# Patient Record
Sex: MALE | Race: WHITE | NOT HISPANIC OR LATINO | Employment: UNEMPLOYED | URBAN - METROPOLITAN AREA
[De-identification: names, ages, dates, MRNs, and addresses within clinical notes are randomized per-mention and may not be internally consistent; named-entity substitution may affect disease eponyms.]

---

## 2018-08-01 ENCOUNTER — OFFICE VISIT (OUTPATIENT)
Dept: URGENT CARE | Facility: CLINIC | Age: 58
End: 2018-08-01

## 2018-08-01 VITALS
TEMPERATURE: 99.1 F | RESPIRATION RATE: 16 BRPM | WEIGHT: 201.2 LBS | OXYGEN SATURATION: 96 % | HEIGHT: 71 IN | SYSTOLIC BLOOD PRESSURE: 213 MMHG | HEART RATE: 101 BPM | DIASTOLIC BLOOD PRESSURE: 112 MMHG | BODY MASS INDEX: 28.17 KG/M2
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
## 2018-08-01 DIAGNOSIS — L03.113 CELLULITIS OF RIGHT ARM: ICD-10-CM

## 2018-08-01 PROCEDURE — 90471 IMMUNIZATION ADMIN: CPT | Performed by: NURSE PRACTITIONER

## 2018-08-01 PROCEDURE — G0382 LEV 3 HOSP TYPE B ED VISIT: HCPCS | Performed by: NURSE PRACTITIONER

## 2018-08-01 PROCEDURE — 90715 TDAP VACCINE 7 YRS/> IM: CPT

## 2018-08-01 RX ORDER — AMOXICILLIN AND CLAVULANATE POTASSIUM 875; 125 MG/1; MG/1
1 TABLET, FILM COATED ORAL EVERY 12 HOURS SCHEDULED
Qty: 20 TABLET | Refills: 0 | Status: SHIPPED | OUTPATIENT
Start: 2018-08-01 | End: 2018-08-06 | Stop reason: HOSPADM

## 2018-08-01 RX ORDER — IBUPROFEN 200 MG
TABLET ORAL EVERY 6 HOURS PRN
COMMUNITY
End: 2020-01-13 | Stop reason: SDUPTHER

## 2018-08-01 NOTE — LETTER
August 1, 2018     Patient: Mirella Augustine   YOB: 1960   Date of Visit: 8/1/2018       To Whom It May Concern: It is my medical opinion that Mirella Augustine may return to work on 08/03/2018, with light duty with concerns to the right arm for one week       If you have any questions or concerns, please don't hesitate to call           Sincerely,        STEFANIE Ledesma    CC: No Recipients

## 2018-08-01 NOTE — PATIENT INSTRUCTIONS
Take meds as directed  Take motrin for pain   Take antibiotic as directed  Pt aware of BP and the need to have a PCP  Go to the ER as directed if symptoms worsen or for the BP  Pt verbalized understanding  Tetanus shot given today  List of doctors given to pt today and MUST see PCP

## 2018-08-01 NOTE — PROGRESS NOTES
NAME: Isabel Gannon is a 59 y o  male  : 1960    MRN: [id number]      Assessment and Plan   Cat bite, initial encounter [W55 01XA]  1  Cat bite, initial encounter  TDAP VACCINE GREATER THAN OR EQUAL TO 8YO IM    amoxicillin-clavulanate (AUGMENTIN) 875-125 mg per tablet   2  Cellulitis of right arm  amoxicillin-clavulanate (AUGMENTIN) 875-125 mg per tablet       Carley Simmons was seen today for animal bite  Diagnoses and all orders for this visit:    Cat bite, initial encounter  -     TDAP VACCINE GREATER THAN OR EQUAL TO 8YO IM  -     amoxicillin-clavulanate (AUGMENTIN) 875-125 mg per tablet; Take 1 tablet by mouth every 12 (twelve) hours for 10 days    Cellulitis of right arm  -     amoxicillin-clavulanate (AUGMENTIN) 875-125 mg per tablet; Take 1 tablet by mouth every 12 (twelve) hours for 10 days    animal bite form filled out today  Tetanus shot given and placed in the left deltoid by the Nurse    Patient Instructions   Patient Instructions   Take meds as directed  Take motrin for pain   Take antibiotic as directed  Pt aware of BP and the need to have a PCP  Go to the ER as directed if symptoms worsen or for the BP  Pt verbalized understanding  Tetanus shot given today  List of doctors given to pt today and MUST see PCP  Proceed to ER if symptoms worsen  Chief Complaint     Chief Complaint   Patient presents with   [address] [place] [address]     yesterday, R wrist/FA area; bit by sisters cat; last tetanus unknown; pain 5/10; redness and swelling noted; denies fevers         History of Present Illness     Pt here today after being bit by a cat and causing him pain  The cat bite was to the R wrist and forearm  The swelling and pain to the right hand and arm are high  Today pts BP is elevated and relocated from [place] 3 years ago and has not seen a PCP here since living here  He is aware of his BP is critical high and needs to follow up with pcp    He denies having headaches, CP, SOB, blurred vision, neck pain, numbness and tingling at this time  He has not been dx with HTN in the past and aware that hte needs to follow up with pcp  Pt will be started on Augmentin today and understand how to take it  Symptoms of pain or worsening symptoms occur he needs to go to the ER  Pt given tetanus today  The cat is his sisters cat, unaware if rabies up dated and will find out, aware of time frame to go to ER for rabies vaccinations if cat is not updated  He will need to go to night if the cat is not up to date  Review of Systems   Review of Systems   Skin: Positive for wound  Current Medications       Current Outpatient Prescriptions:     ibuprofen (MOTRIN) 200 mg tablet, Take by mouth every 6 (six) hours as needed for mild pain, Disp: , Rfl:     amoxicillin-clavulanate (AUGMENTIN) 875-125 mg per tablet, Take 1 tablet by mouth every 12 (twelve) hours for 10 days, Disp: 20 tablet, Rfl: 0    Current Allergies     Allergies as of 08/01/2018 - Reviewed 08/01/2018   Allergen Reaction Noted    Other  08/01/2018              History reviewed  No pertinent past medical history  History reviewed  No pertinent surgical history  No family history on file  Medications have been verified  The following portions of the patient's history were reviewed and updated as appropriate: allergies, current medications, past family history, past medical history, past social history, past surgical history and problem list     Objective   BP (!) 213/112   Pulse 101   Temp 99 1 °F (37 3 °C) (Tympanic)   Resp 16   Ht 5' 11" (1 803 m)   Wt 91 3 kg (201 lb 3 2 oz)   SpO2 96%   BMI 28 06 kg/m²      Physical Exam     Physical Exam   Constitutional: He appears well-developed and well-nourished  Musculoskeletal:        Right shoulder: He exhibits decreased range of motion, tenderness, swelling, pain and decreased strength          Arms:      BETH Ravi

## 2018-08-03 ENCOUNTER — HOSPITAL ENCOUNTER (INPATIENT)
Facility: HOSPITAL | Age: 58
LOS: 3 days | Discharge: HOME | DRG: 949 | End: 2018-08-06
Attending: EMERGENCY MEDICINE | Admitting: INTERNAL MEDICINE

## 2018-08-03 ENCOUNTER — APPOINTMENT (EMERGENCY)
Dept: RADIOLOGY | Facility: HOSPITAL | Age: 58
DRG: 949 | End: 2018-08-03

## 2018-08-03 DIAGNOSIS — Z72.0 TOBACCO ABUSE: ICD-10-CM
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
## 2018-08-03 DIAGNOSIS — L03.113 CELLULITIS OF RIGHT ARM: ICD-10-CM

## 2018-08-03 DIAGNOSIS — S51.851D: Primary | ICD-10-CM

## 2018-08-03 DIAGNOSIS — I10 ESSENTIAL HYPERTENSION: ICD-10-CM
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
## 2018-08-03 DIAGNOSIS — L08.9: Primary | ICD-10-CM

## 2018-08-03 PROBLEM — Z78.9 ALCOHOL CONSUMPTION OF ONE TO FOUR DRINKS PER DAY: Status: ACTIVE | Noted: 2018-08-03

## 2018-08-03 PROBLEM — E87.6 HYPOKALEMIA: Status: ACTIVE | Noted: 2018-08-03

## 2018-08-03 PROBLEM — S51.851S: Status: ACTIVE | Noted: 2018-08-03

## 2018-08-03 PROBLEM — E87.1 HYPONATREMIA: Status: ACTIVE | Noted: 2018-08-03
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
## 2018-08-03 LAB
ALBUMIN SERPL BCP-MCNC: 3.4 G/DL (ref 3.5–5)
ALP SERPL-CCNC: 120 U/L (ref 46–116)
ALT SERPL W P-5'-P-CCNC: 21 U/L (ref 12–78)
ANION GAP SERPL CALCULATED.3IONS-SCNC: 11 MMOL/L (ref 4–13)
AST SERPL W P-5'-P-CCNC: 12 U/L (ref 5–45)
BASOPHILS # BLD AUTO: 0.05 THOUSANDS/ΜL (ref 0–0.1)
BASOPHILS NFR BLD AUTO: 0 % (ref 0–1)
BILIRUB SERPL-MCNC: 0.7 MG/DL (ref 0.2–1)
BUN SERPL-MCNC: 12 MG/DL (ref 5–25)
CALCIUM SERPL-MCNC: 9.4 MG/DL (ref 8.3–10.1)
CHLORIDE SERPL-SCNC: 99 MMOL/L (ref 100–108)
CO2 SERPL-SCNC: 25 MMOL/L (ref 21–32)
CREAT SERPL-MCNC: 0.95 MG/DL (ref 0.6–1.3)
EOSINOPHIL # BLD AUTO: 0.01 THOUSAND/ΜL (ref 0–0.61)
EOSINOPHIL NFR BLD AUTO: 0 % (ref 0–6)
ERYTHROCYTE [DISTWIDTH] IN BLOOD BY AUTOMATED COUNT: 11.9 % (ref 11.6–15.1)
GFR SERPL CREATININE-BSD FRML MDRD: 87 ML/MIN/1.73SQ M
GLUCOSE SERPL-MCNC: 138 MG/DL (ref 65–140)
HCT VFR BLD AUTO: 44.3 % (ref 36.5–49.3)
HGB BLD-MCNC: 15.7 G/DL (ref 12–17)
IMM GRANULOCYTES # BLD AUTO: 0.07 THOUSAND/UL (ref 0–0.2)
IMM GRANULOCYTES NFR BLD AUTO: 1 % (ref 0–2)
LACTATE SERPL-SCNC: 0.9 MMOL/L (ref 0.5–2)
LYMPHOCYTES # BLD AUTO: 0.58 THOUSANDS/ΜL (ref 0.6–4.47)
LYMPHOCYTES NFR BLD AUTO: 5 % (ref 14–44)
MCH RBC QN AUTO: 35.6 PG (ref 26.8–34.3)
MCHC RBC AUTO-ENTMCNC: 35.4 G/DL (ref 31.4–37.4)
MCV RBC AUTO: 101 FL (ref 82–98)
MONOCYTES # BLD AUTO: 0.83 THOUSAND/ΜL (ref 0.17–1.22)
MONOCYTES NFR BLD AUTO: 7 % (ref 4–12)
NEUTROPHILS # BLD AUTO: 11.09 THOUSANDS/ΜL (ref 1.85–7.62)
NEUTS SEG NFR BLD AUTO: 87 % (ref 43–75)
NRBC BLD AUTO-RTO: 0 /100 WBCS
PLATELET # BLD AUTO: 181 THOUSANDS/UL (ref 149–390)
PMV BLD AUTO: 8.7 FL (ref 8.9–12.7)
POTASSIUM SERPL-SCNC: 3.3 MMOL/L (ref 3.5–5.3)
PROT SERPL-MCNC: 8.1 G/DL (ref 6.4–8.2)
RBC # BLD AUTO: 4.41 MILLION/UL (ref 3.88–5.62)
SODIUM SERPL-SCNC: 135 MMOL/L (ref 136–145)
WBC # BLD AUTO: 12.63 THOUSAND/UL (ref 4.31–10.16)

## 2018-08-03 PROCEDURE — 83605 ASSAY OF LACTIC ACID: CPT | Performed by: PHYSICIAN ASSISTANT

## 2018-08-03 PROCEDURE — 99284 EMERGENCY DEPT VISIT MOD MDM: CPT

## 2018-08-03 PROCEDURE — 87040 BLOOD CULTURE FOR BACTERIA: CPT | Performed by: PHYSICIAN ASSISTANT

## 2018-08-03 PROCEDURE — 80053 COMPREHEN METABOLIC PANEL: CPT | Performed by: PHYSICIAN ASSISTANT

## 2018-08-03 PROCEDURE — 99222 1ST HOSP IP/OBS MODERATE 55: CPT | Performed by: INTERNAL MEDICINE

## 2018-08-03 PROCEDURE — 96365 THER/PROPH/DIAG IV INF INIT: CPT

## 2018-08-03 PROCEDURE — 36415 COLL VENOUS BLD VENIPUNCTURE: CPT | Performed by: PHYSICIAN ASSISTANT

## 2018-08-03 PROCEDURE — 85025 COMPLETE CBC W/AUTO DIFF WBC: CPT | Performed by: PHYSICIAN ASSISTANT

## 2018-08-03 PROCEDURE — 73090 X-RAY EXAM OF FOREARM: CPT

## 2018-08-03 RX ORDER — THIAMINE HYDROCHLORIDE 100 MG/ML
100 INJECTION, SOLUTION INTRAMUSCULAR; INTRAVENOUS DAILY
Status: DISCONTINUED | OUTPATIENT
Start: 2018-08-04 | End: 2018-08-04

## 2018-08-03 RX ORDER — LISINOPRIL 20 MG/1
20 TABLET ORAL DAILY
Status: DISCONTINUED | OUTPATIENT
Start: 2018-08-03 | End: 2018-08-06 | Stop reason: HOSPADM

## 2018-08-03 RX ORDER — LORAZEPAM 1 MG/1
1 TABLET ORAL 3 TIMES DAILY
Status: DISCONTINUED | OUTPATIENT
Start: 2018-08-03 | End: 2018-08-06 | Stop reason: HOSPADM

## 2018-08-03 RX ORDER — DEXTROSE, SODIUM CHLORIDE, AND POTASSIUM CHLORIDE 5; .9; .15 G/100ML; G/100ML; G/100ML
100 INJECTION INTRAVENOUS CONTINUOUS
Status: DISCONTINUED | OUTPATIENT
Start: 2018-08-03 | End: 2018-08-04

## 2018-08-03 RX ORDER — ONDANSETRON 2 MG/ML
4 INJECTION INTRAMUSCULAR; INTRAVENOUS ONCE
Status: DISCONTINUED | OUTPATIENT
Start: 2018-08-03 | End: 2018-08-06 | Stop reason: HOSPADM

## 2018-08-03 RX ORDER — NICOTINE 21 MG/24HR
1 PATCH, TRANSDERMAL 24 HOURS TRANSDERMAL DAILY
Status: DISCONTINUED | OUTPATIENT
Start: 2018-08-04 | End: 2018-08-06 | Stop reason: HOSPADM

## 2018-08-03 RX ORDER — OXYCODONE HYDROCHLORIDE AND ACETAMINOPHEN 5; 325 MG/1; MG/1
1 TABLET ORAL EVERY 6 HOURS PRN
Status: DISCONTINUED | OUTPATIENT
Start: 2018-08-03 | End: 2018-08-06 | Stop reason: HOSPADM

## 2018-08-03 RX ORDER — MORPHINE SULFATE 2 MG/ML
2 INJECTION, SOLUTION INTRAMUSCULAR; INTRAVENOUS EVERY 4 HOURS PRN
Status: DISCONTINUED | OUTPATIENT
Start: 2018-08-03 | End: 2018-08-06 | Stop reason: HOSPADM

## 2018-08-03 RX ORDER — ACETAMINOPHEN 325 MG/1
650 TABLET ORAL EVERY 6 HOURS PRN
Status: DISCONTINUED | OUTPATIENT
Start: 2018-08-03 | End: 2018-08-06 | Stop reason: HOSPADM

## 2018-08-03 RX ORDER — LABETALOL HYDROCHLORIDE 5 MG/ML
10 INJECTION, SOLUTION INTRAVENOUS EVERY 4 HOURS PRN
Status: DISCONTINUED | OUTPATIENT
Start: 2018-08-03 | End: 2018-08-06 | Stop reason: HOSPADM

## 2018-08-03 RX ADMIN — SODIUM CHLORIDE 1000 ML: 0.9 INJECTION, SOLUTION INTRAVENOUS at 14:49

## 2018-08-03 RX ADMIN — LORAZEPAM 1 MG: 1 TABLET ORAL at 19:20

## 2018-08-03 RX ADMIN — LABETALOL 20 MG/4 ML (5 MG/ML) INTRAVENOUS SYRINGE 10 MG: at 19:20

## 2018-08-03 RX ADMIN — SODIUM CHLORIDE 3 G: 900 INJECTION INTRAVENOUS at 20:55

## 2018-08-03 RX ADMIN — SODIUM CHLORIDE 3 G: 900 INJECTION INTRAVENOUS at 14:36

## 2018-08-03 RX ADMIN — LISINOPRIL 20 MG: 20 TABLET ORAL at 17:18

## 2018-08-03 RX ADMIN — OXYCODONE HYDROCHLORIDE AND ACETAMINOPHEN 1 TABLET: 5; 325 TABLET ORAL at 19:39

## 2018-08-03 RX ADMIN — DEXTROSE, SODIUM CHLORIDE, AND POTASSIUM CHLORIDE 100 ML/HR: 5; .9; .15 INJECTION INTRAVENOUS at 17:45

## 2018-08-03 NOTE — ED PROVIDER NOTES
History  Chief Complaint   Patient presents with    Animal Bite     Patient states he was bit by his sisters cat on Monday and seen at the Urgent care on Tuesday  Area of redness outlined and less red hbut he staets his hand is more swollen  Patient placed on Augmentin     Patient presents to the ED with cat bite to right forearm that occurred 3 days ago  He states he was playing with his sister's cat and it bit him  He did clean the wound with alcohol after getting bit  Rabies immunizations are UTD  Patient was seen at urgent care 2 days ago and started on Augmentin and given tetanus booster, but right arm swelling and pain is worsening  Patient denies fevers/chills/nausea/vomiting  He has been compliant with antibiotic  He has significant swelling in right hand, but denies numbness/tingling  History provided by:  Patient  Cat Bite   Contact animal:  Cat  Location:  Shoulder/arm  Shoulder/arm injury location:  R forearm  Time since incident:  3 days  Pain details:     Quality:  Aching    Severity:  Moderate    Progression:  Worsening  Incident location:  Another residence  Animal's rabies vaccination status:  Up to date  Animal in possession: yes    Tetanus status:  Up to date (2 days ago)  Relieved by:  Nothing  Worsened by:  Nothing  Ineffective treatments: augmentin  Associated symptoms: rash and swelling    Associated symptoms: no fever and no numbness        Prior to Admission Medications   Prescriptions Last Dose Informant Patient Reported? Taking?   amoxicillin-clavulanate (AUGMENTIN) 875-125 mg per tablet   No No   Sig: Take 1 tablet by mouth every 12 (twelve) hours for 10 days   ibuprofen (MOTRIN) 200 mg tablet   Yes No   Sig: Take by mouth every 6 (six) hours as needed for mild pain      Facility-Administered Medications: None       History reviewed  No pertinent past medical history  History reviewed  No pertinent surgical history  History reviewed   No pertinent family history  I have reviewed and agree with the history as documented  Social History   Substance Use Topics    Smoking status: Current Every Day Smoker    Smokeless tobacco: Never Used    Alcohol use Yes        Review of Systems   Constitutional: Negative for chills and fever  Skin: Positive for color change, rash and wound  Neurological: Negative for dizziness, weakness and numbness  Psychiatric/Behavioral: Negative for confusion  All other systems reviewed and are negative  Physical Exam  Physical Exam   Constitutional: He appears well-developed and well-nourished  HENT:   Head: Normocephalic and atraumatic  Eyes: Conjunctivae are normal    Neck: Normal range of motion  Cardiovascular: Normal rate, regular rhythm and normal heart sounds  Pulses:       Radial pulses are 2+ on the right side, and 2+ on the left side  Pulmonary/Chest: Effort normal and breath sounds normal    Musculoskeletal:        Right forearm: He exhibits tenderness and swelling  He exhibits no bony tenderness and no deformity  Arms:       Right hand: He exhibits tenderness, decreased capillary refill and swelling  He exhibits normal range of motion, no bony tenderness, no deformity and no laceration  Normal sensation noted  Normal strength noted  Skin: Skin is warm and dry  There is erythema (right forearm and hand  )  Capillary refill 3 seconds on right hand  Right hand erythema  Not pale or cold  Nursing note and vitals reviewed        Vital Signs  ED Triage Vitals [08/03/18 1354]   Temperature Pulse Respirations Blood Pressure SpO2   99 6 °F (37 6 °C) (!) 116 20 (!) 190/120 94 %      Temp src Heart Rate Source Patient Position - Orthostatic VS BP Location FiO2 (%)   -- -- -- -- --      Pain Score       7           Vitals:    08/03/18 1358 08/03/18 1445 08/03/18 1450 08/03/18 1500   BP: (!) 163/109 (!) 191/111 (!) 191/110 (!) 192/104   Pulse: (!) 114 96 94 88       Visual Acuity      ED Medications  Medications   ampicillin-sulbactam (UNASYN) 3 g in sodium chloride 0 9 % 100 mL IVPB (0 g Intravenous Stopped 8/3/18 1513)   sodium chloride 0 9 % bolus 1,000 mL (1,000 mL Intravenous New Bag 8/3/18 1449)       Diagnostic Studies  Results Reviewed     Procedure Component Value Units Date/Time    Lactic acid, plasma [[id number]]  (Normal) Collected:  08/03/18 1447    Lab Status:  Final result Specimen:  Blood from Arm, Right Updated:  08/03/18 1521     LACTIC ACID 0 9 mmol/L     Narrative:         Result may be elevated if tourniquet was used during collection  Blood culture #2 [[id number]] Collected:  08/03/18 1447    Lab Status: In process Specimen:  Blood from Arm, Left Updated:  08/03/18 1459    Blood culture #1 [[id number]] Collected:  08/03/18 1447    Lab Status: In process Specimen:  Blood from Arm, Right Updated:  08/03/18 1459    Comprehensive metabolic panel [[id number]]  (Abnormal) Collected:  08/03/18 1425    Lab Status:  Final result Specimen:  Blood from Arm, Left Updated:  08/03/18 1456     Sodium 135 (L) mmol/L      Potassium 3 3 (L) mmol/L      Chloride 99 (L) mmol/L      CO2 25 mmol/L      Anion Gap 11 mmol/L      BUN 12 mg/dL      Creatinine 0 95 mg/dL      Glucose 138 mg/dL      Calcium 9 4 mg/dL      AST 12 U/L      ALT 21 U/L      Alkaline Phosphatase 120 (H) U/L      Total Protein 8 1 g/dL      Albumin 3 4 (L) g/dL      Total Bilirubin 0 70 mg/dL      eGFR 87 ml/min/1 73sq m     Narrative:         National Kidney Disease Education Program recommendations are as follows:  GFR calculation is accurate only with a steady state creatinine  Chronic Kidney disease less than 60 ml/min/1 73 sq  meters  Kidney failure less than 15 ml/min/1 73 sq  meters      CBC and differential [[id number]]  (Abnormal) Collected:  08/03/18 1425    Lab Status:  Final result Specimen:  Blood from Arm, Left Updated:  08/03/18 1433     WBC 12 63 (H) Thousand/uL      RBC 4 41 Million/uL      Hemoglobin 15 7 g/dL Hematocrit 44 3 %       (H) fL      MCH 35 6 (H) pg      MCHC 35 4 g/dL      RDW 11 9 %      MPV 8 7 (L) fL      Platelets 125 Thousands/uL      nRBC 0 /100 WBCs      Neutrophils Relative 87 (H) %      Immat GRANS % 1 %      Lymphocytes Relative 5 (L) %      Monocytes Relative 7 %      Eosinophils Relative 0 %      Basophils Relative 0 %      Neutrophils Absolute 11 09 (H) Thousands/µL      Immature Grans Absolute 0 07 Thousand/uL      Lymphocytes Absolute 0 58 (L) Thousands/µL      Monocytes Absolute 0 83 Thousand/µL      Eosinophils Absolute 0 01 Thousand/µL      Basophils Absolute 0 05 Thousands/µL                  XR forearm 2 views RIGHT   Final Result by Noe Dela Cruz MD (08/03 9748)      Dorsal soft tissue swelling at the distal forearm  No teeth fragments demonstrated  No underlying bony destruction to suggest osteomyelitis  Workstation performed: [id number]                    Procedures  Procedures       Phone Contacts  ED Phone Contact    ED Course                               MDM  Number of Diagnoses or Management Options  Infected cat bite of forearm, right, subsequent encounter: established and worsening  Diagnosis management comments: Patient with infected cat bite, worsening even with oral antibiotics  Tetanus UTD  Rabies vaccine UTD          Amount and/or Complexity of Data Reviewed  Clinical lab tests: ordered and reviewed  Tests in the radiology section of CPT®: ordered and reviewed  Independent visualization of images, tracings, or specimens: yes    Patient Progress  Patient progress: stable    CritCare Time    Disposition  Final diagnoses:   Infected cat bite of forearm, right, subsequent encounter     Time reflects when diagnosis was documented in both MDM as applicable and the Disposition within this note     Time User Action Codes Description Comment    8/3/2018  3:08 PM Daisy Cortes Add [S51 851D,  L08 9,  W55 01XD] Infected cat bite of forearm, right, subsequent encounter       ED Disposition     ED Disposition Condition Comment    Admit  Case was discussed with Dr Finn Eubanks and the patient's admission status was agreed to be Admission Status: inpatient status to the service of Dr Cindi Barrientos   Follow-up Information    None         Patient's Medications   Discharge Prescriptions    No medications on file     No discharge procedures on file      ED Provider  Electronically Signed by           Xin White PA-C  08/03/18 8395

## 2018-08-03 NOTE — ED NOTES
R hand swollen and red    Area marked from yesterday and moted to have less redness and he states his hand is more swelling     Jefferson Lockett RN  08/03/18 1068

## 2018-08-03 NOTE — H&P
History and Physical - Babita Parker 59 y o  male MRN: [id number]  Unit/Bed#: ED 01 Encounter: [id number]    Assessment/Plan     Assessment:  Principal Problem:    Cat bite of forearm, right, sequela  Active Problems:    Hypertension    Alcohol consumption of one to four drinks per day      Plan:  [facility] 8 floor  IV antibiotics orthopedic consult  Patient may need I and D of hand  I am concerned about development of compartment syndrome  Pain control    Will place on blood pressure medications  Placed empirically on Ativan and thiamin  Patient claims he has stopped drinking the past without problems  History of Present Illness   HPI: Babita Parker is a 59y o  year old male who presents with cat bite of the right hand  Patient was bit by his cat on Tuesday  The next day 2 days ago he went to the Urgent Center was placed on Augmentin  In spite of this he has developed increasing pain swelling particularly over the dorsum of the right hand  He also has had some fever and chills  He patient admitted hospital for evaluation and treatment  Also noted to have an elevated blood pressure  Further to 3 years since he seen a physician however  There is no previous history of hypertension  Patient does admit to drinking 3-4 vodka tonics per night  Review of Systems   Constitutional: Negative  Respiratory: Negative  Cardiovascular: Negative  Gastrointestinal: Negative  Genitourinary: Negative  Historical Information   History reviewed  No pertinent past medical history  History reviewed  No pertinent surgical history  Social History   History   Alcohol Use    Yes     History   Drug Use No     History   Smoking Status    Current Every Day Smoker   Smokeless Tobacco    Never Used     Family History: History reviewed  No pertinent family history  Meds/Allergies      No current facility-administered medications for this encounter            (Not in a hospital admission)  Allergies   Allergen Reactions    Other      Thiomersal (contact solution) - pt reaction is eye redness         Objective   Vitals: Blood pressure (!) 192/104, pulse 88, temperature 99 6 °F (37 6 °C), resp  rate 20, height 5' 11" (1 803 m), weight 91 2 kg (201 lb), SpO2 96 %  No intake or output data in the 24 hours ending 08/03/18 1634  Invasive Devices     Peripheral Intravenous Line            Peripheral IV 08/03/18 Left Antecubital less than 1 day                Physical Exam   Constitutional: He appears well-developed and well-nourished  Vitals reviewed        Lab Results:   Admission on 08/03/2018   Component Date Value    WBC 08/03/2018 12 63*    RBC 08/03/2018 4 41     Hemoglobin 08/03/2018 15 7     Hematocrit 08/03/2018 44 3     MCV 08/03/2018 101*    MCH 08/03/2018 35 6*    MCHC 08/03/2018 35 4     RDW 08/03/2018 11 9     MPV 08/03/2018 8 7*    Platelets 47/91/6612 181     nRBC 08/03/2018 0     Neutrophils Relative 08/03/2018 87*    Immat GRANS % 08/03/2018 1     Lymphocytes Relative 08/03/2018 5*    Monocytes Relative 08/03/2018 7     Eosinophils Relative 08/03/2018 0     Basophils Relative 08/03/2018 0     Neutrophils Absolute 08/03/2018 11 09*    Immature Grans Absolute 08/03/2018 0 07     Lymphocytes Absolute 08/03/2018 0 58*    Monocytes Absolute 08/03/2018 0 83     Eosinophils Absolute 08/03/2018 0 01     Basophils Absolute 08/03/2018 0 05     Sodium 08/03/2018 135*    Potassium 08/03/2018 3 3*    Chloride 08/03/2018 99*    CO2 08/03/2018 25     Anion Gap 08/03/2018 11     BUN 08/03/2018 12     Creatinine 08/03/2018 0 95     Glucose 08/03/2018 138     Calcium 08/03/2018 9 4     AST 08/03/2018 12     ALT 08/03/2018 21     Alkaline Phosphatase 08/03/2018 120*    Total Protein 08/03/2018 8 1     Albumin 08/03/2018 3 4*    Total Bilirubin 08/03/2018 0 70     eGFR 08/03/2018 87     LACTIC ACID 08/03/2018 0 9      Imaging Studies: I have personally reviewed pertinent reports  EKG, Pathology, and Other Studies: I have personally reviewed pertinent reports  VTE  Prophylaxis: Algorithmic determination of appropriate prophylaxis determined  This note has been constructed using a voice recognition system         Michaela Stapleton MD

## 2018-08-04 LAB
ANION GAP SERPL CALCULATED.3IONS-SCNC: 9 MMOL/L (ref 4–13)
BUN SERPL-MCNC: 11 MG/DL (ref 5–25)
CALCIUM SERPL-MCNC: 8.3 MG/DL (ref 8.3–10.1)
CHLORIDE SERPL-SCNC: 103 MMOL/L (ref 100–108)
CO2 SERPL-SCNC: 25 MMOL/L (ref 21–32)
CREAT SERPL-MCNC: 0.64 MG/DL (ref 0.6–1.3)
ERYTHROCYTE [DISTWIDTH] IN BLOOD BY AUTOMATED COUNT: 11.9 % (ref 11.6–15.1)
GFR SERPL CREATININE-BSD FRML MDRD: 106 ML/MIN/1.73SQ M
GLUCOSE SERPL-MCNC: 129 MG/DL (ref 65–140)
HCT VFR BLD AUTO: 38.8 % (ref 36.5–49.3)
HGB BLD-MCNC: 12.9 G/DL (ref 12–17)
MCH RBC QN AUTO: 34.5 PG (ref 26.8–34.3)
MCHC RBC AUTO-ENTMCNC: 33.2 G/DL (ref 31.4–37.4)
MCV RBC AUTO: 104 FL (ref 82–98)
PLATELET # BLD AUTO: 170 THOUSANDS/UL (ref 149–390)
PMV BLD AUTO: 9.1 FL (ref 8.9–12.7)
POTASSIUM SERPL-SCNC: 3.2 MMOL/L (ref 3.5–5.3)
RBC # BLD AUTO: 3.74 MILLION/UL (ref 3.88–5.62)
SODIUM SERPL-SCNC: 137 MMOL/L (ref 136–145)
WBC # BLD AUTO: 7.81 THOUSAND/UL (ref 4.31–10.16)

## 2018-08-04 PROCEDURE — 99254 IP/OBS CNSLTJ NEW/EST MOD 60: CPT | Performed by: ORTHOPAEDIC SURGERY

## 2018-08-04 PROCEDURE — 85027 COMPLETE CBC AUTOMATED: CPT | Performed by: INTERNAL MEDICINE

## 2018-08-04 PROCEDURE — 80048 BASIC METABOLIC PNL TOTAL CA: CPT | Performed by: INTERNAL MEDICINE

## 2018-08-04 PROCEDURE — 99232 SBSQ HOSP IP/OBS MODERATE 35: CPT | Performed by: INTERNAL MEDICINE

## 2018-08-04 RX ORDER — THIAMINE MONONITRATE (VIT B1) 100 MG
100 TABLET ORAL DAILY
Status: DISCONTINUED | OUTPATIENT
Start: 2018-08-04 | End: 2018-08-06 | Stop reason: HOSPADM

## 2018-08-04 RX ORDER — POTASSIUM CHLORIDE 20 MEQ/1
20 TABLET, EXTENDED RELEASE ORAL 2 TIMES DAILY
Status: COMPLETED | OUTPATIENT
Start: 2018-08-04 | End: 2018-08-04

## 2018-08-04 RX ADMIN — LORAZEPAM 1 MG: 1 TABLET ORAL at 08:40

## 2018-08-04 RX ADMIN — OXYCODONE HYDROCHLORIDE AND ACETAMINOPHEN 1 TABLET: 5; 325 TABLET ORAL at 22:08

## 2018-08-04 RX ADMIN — POTASSIUM CHLORIDE 20 MEQ: 1500 TABLET, EXTENDED RELEASE ORAL at 17:01

## 2018-08-04 RX ADMIN — LORAZEPAM 1 MG: 1 TABLET ORAL at 21:58

## 2018-08-04 RX ADMIN — LISINOPRIL 20 MG: 20 TABLET ORAL at 08:39

## 2018-08-04 RX ADMIN — LORAZEPAM 1 MG: 1 TABLET ORAL at 04:05

## 2018-08-04 RX ADMIN — NICOTINE 1 PATCH: 14 PATCH TRANSDERMAL at 08:40

## 2018-08-04 RX ADMIN — THIAMINE HCL TAB 100 MG 100 MG: 100 TAB at 11:49

## 2018-08-04 RX ADMIN — SODIUM CHLORIDE 3 G: 900 INJECTION INTRAVENOUS at 02:25

## 2018-08-04 RX ADMIN — ENOXAPARIN SODIUM 40 MG: 100 INJECTION SUBCUTANEOUS at 08:40

## 2018-08-04 RX ADMIN — LORAZEPAM 1 MG: 1 TABLET ORAL at 17:01

## 2018-08-04 RX ADMIN — SODIUM CHLORIDE 3 G: 900 INJECTION INTRAVENOUS at 19:51

## 2018-08-04 RX ADMIN — LABETALOL 20 MG/4 ML (5 MG/ML) INTRAVENOUS SYRINGE 10 MG: at 23:51

## 2018-08-04 RX ADMIN — SODIUM CHLORIDE 3 G: 900 INJECTION INTRAVENOUS at 08:49

## 2018-08-04 RX ADMIN — DEXTROSE, SODIUM CHLORIDE, AND POTASSIUM CHLORIDE 100 ML/HR: 5; .9; .15 INJECTION INTRAVENOUS at 05:00

## 2018-08-04 RX ADMIN — SODIUM CHLORIDE 3 G: 900 INJECTION INTRAVENOUS at 14:58

## 2018-08-04 RX ADMIN — POTASSIUM CHLORIDE 20 MEQ: 1500 TABLET, EXTENDED RELEASE ORAL at 11:49

## 2018-08-04 NOTE — PROGRESS NOTES
Progress Note - Juan Pablo Shoemaker 59 y o  male MRN: [id number]    Unit/Bed#: [address] [place] [address] 205-02 Encounter: [id number]      Assessment:    Principal Problem:    Cat bite of forearm, right, sequela  Active Problems:    Hypertension    Alcohol consumption of one to four drinks per day    Hypokalemia    Hyponatremia  Resolved Problems:    * No resolved hospital problems  *      Plan:  Patient clearly better hopefully discharge tomorrow  Subjective:   Less pain and hand    Objective:     Vitals: Blood pressure 154/87, pulse 67, temperature 98 5 °F (36 9 °C), temperature source Oral, resp  rate 18, height 5' 11" (1 803 m), weight 91 6 kg (201 lb 15 1 oz), SpO2 96 %  ,Body mass index is 28 17 kg/m²  Intake/Output Summary (Last 24 hours) at 08/04/18 0924  Last data filed at 08/04/18 0849   Gross per 24 hour   Intake              990 ml   Output              650 ml   Net              340 ml       Physical Exam:    Alert and awake in no acute distress  Extremities:  Clearly less erythema and soft tissue swelling right upper extremity  Neuro: alert, no facial asymmetry,  not tremulous      Invasive Devices     Peripheral Intravenous Line            Peripheral IV 08/03/18 Left Antecubital less than 1 day                            Lab, Imaging and other studies: I have personally reviewed pertinent reports         Results from last 7 days  Lab Units 08/04/18  0547 08/03/18  1425   WBC Thousand/uL 7 81 12 63*   HEMOGLOBIN g/dL 12 9 15 7   HEMATOCRIT % 38 8 44 3   PLATELETS Thousands/uL 170 181   NEUTROS PCT %  --  87*   LYMPHS PCT %  --  5*   MONOS PCT %  --  7   EOS PCT %  --  0       Results from last 7 days  Lab Units 08/04/18  0547 08/03/18  1425   SODIUM mmol/L 137 135*   POTASSIUM mmol/L 3 2* 3 3*   CHLORIDE mmol/L 103 99*   CO2 mmol/L 25 25   BUN mg/dL 11 12   CREATININE mg/dL 0 64 0 95   CALCIUM mg/dL 8 3 9 4   TOTAL PROTEIN g/dL  --  8 1   BILIRUBIN TOTAL mg/dL  --  0 70   ALK PHOS U/L  --  120*   ALT U/L  --  21 AST U/L  --  12   GLUCOSE RANDOM mg/dL 129 138     No results found for: TROPONINI, CKMB, CKTOTAL      No results found for: Ayesha Jamin, SPUTUMCULTUR    Imaging:  No results found for this or any previous visit  No results found for this or any previous visit  PATIENT CENTERED ROUNDS: I have performed rounds with the nursing staff  This note has been constructed using a voice recognition system      Eliza Clark MD

## 2018-08-04 NOTE — CONSULTS
Consultation - Orthopedics   Citlaly Alvarado 59 y o  male MRN: [id number]  Unit/Bed#: [address] [place] [address] 205-02 Encounter: [id number]      Assessment/Plan     Assessment:  Left wrist cellulitis    Plan:  58-year-old male with a cat bite to the left forearm  No indication for surgical intervention at this time  Continue antibiotics  Patient was counseled on range of motion of the fingers, hand, and wrist, and elbow  He was also counseled on elevation of the upper extremity  Patient will be NPO after midnight with re-evaluation tomorrow  At this point I see no evidence of knee for surgery  History of Present Illness   Physician Requesting Consult: Rios Krishnan MD  Reason for Consult / Principal Problem:   Cat bite with hand swelling infection     HPI: Citlaly Alvarado is a 59y o  year old male who presents with pain and swelling in his left arm  He had a cat bite on July 31st   It was sister's CT  Following day he was working  He had increasing pain and swelling  He was seen at urgent care and started on an antibiotic which he took  He has not worked since Thursday or Friday but had continued increasing pain and swelling  He went to the emergency room yesterday where he was admitted for cellulitis concern over his swelling  Since being on the antibiotics patient notes improvement in his swelling and the redness since last night  He is right-hand dominant  He denies any fevers or chills  Inpatient consult to Orthopedic Surgery  Consult performed by: Maximino Baez ordered by: Saint Lashanda          Review of Systems   Constitutional: Negative  HENT: Negative  Eyes: Negative  Respiratory: Negative  Cardiovascular: Negative  Gastrointestinal: Negative  Endocrine: Negative  Genitourinary: Negative  Musculoskeletal: Positive for arthralgias, joint swelling and myalgias  Skin: Negative  Allergic/Immunologic: Negative  Neurological: Negative  Hematological: Negative  Psychiatric/Behavioral: Negative  Historical Information   History reviewed  No pertinent past medical history  History reviewed  No pertinent surgical history  Social History   History   Alcohol Use    Yes     History   Drug Use No     History   Smoking Status    Current Every Day Smoker    Packs/day: 0 50   Smokeless Tobacco    Never Used     Family History: History reviewed  No pertinent family history  Meds/Allergies   all current active meds have been reviewed  Allergies   Allergen Reactions    Other      Thiomersal (contact solution) - pt reaction is eye redness         Objective   Vitals: Blood pressure 154/87, pulse 67, temperature 98 5 °F (36 9 °C), temperature source Oral, resp  rate 18, height 5' 11" (1 803 m), weight 91 6 kg (201 lb 15 1 oz), SpO2 96 %  ,Body mass index is 28 17 kg/m²  Intake/Output Summary (Last 24 hours) at 08/04/18 0740  Last data filed at 08/04/18 0459   Gross per 24 hour   Intake              990 ml   Output              150 ml   Net              840 ml     I/O last 24 hours: In: 990 [I V :990]  Out: 150 [Urine:150]    Invasive Devices     Peripheral Intravenous Line            Peripheral IV 08/03/18 Left Antecubital less than 1 day                Physical Exam   Constitutional: He is oriented to person, place, and time  He appears well-developed and well-nourished  No distress  HENT:   Head: Normocephalic and atraumatic  Eyes: Right eye exhibits no discharge  Left eye exhibits no discharge  Neck: Normal range of motion  Neck supple  No tracheal deviation present  Cardiovascular: Normal rate and regular rhythm  Pulmonary/Chest: Effort normal  No respiratory distress  Abdominal: Soft  He exhibits no distension  There is no tenderness  Musculoskeletal: He exhibits edema  Neurological: He is alert and oriented to person, place, and time  Skin: Skin is warm  He is not diaphoretic  No erythema  Psychiatric: He has a normal mood and affect   His behavior is normal      Right Hand Exam     Comments:  Patient has minimal erythema from the middle aspect of the forearm to the dorsal aspect of the hand  There is 20° of wrist dorsiflexion and palmar flexion  There is no fluctuance  Patient has to healing puncture sites the distal forearm  There is no fluctuance or swelling in the area  Patient fingers and hand have moderate amount edema, dorsal palmar  There positive wrinkles  He is able to close to a complete composite fist       Left Hand Exam   Left hand exam is normal     Tenderness   The patient is experiencing no tenderness  Range of Motion   The patient has normal left wrist ROM  Muscle Strength   The patient has normal left wrist strength  Tests   Phalens Sign: negative  Tinels Sign (Medial Nerve): negative    Other   Erythema: absent  Sensation: normal  Pulse: present            Lab Results:   I have personally reviewed pertinent lab results  CBC:   Lab Results   Component Value Date    WBC 7 81 08/04/2018    HGB 12 9 08/04/2018    HCT 38 8 08/04/2018     (H) 08/04/2018     08/04/2018    MCH 34 5 (H) 08/04/2018    MCHC 33 2 08/04/2018    RDW 11 9 08/04/2018    MPV 9 1 08/04/2018    NRBC 0 08/03/2018     CMP:   Lab Results   Component Value Date     08/04/2018     08/04/2018    CO2 25 08/04/2018    ANIONGAP 9 08/04/2018    BUN 11 08/04/2018    CREATININE 0 64 08/04/2018    GLUCOSE 129 08/04/2018    CALCIUM 8 3 08/04/2018    AST 12 08/03/2018    ALT 21 08/03/2018    ALKPHOS 120 (H) 08/03/2018    PROT 8 1 08/03/2018    BILITOT 0 70 08/03/2018    EGFR 106 08/04/2018     Imaging Studies: I have personally reviewed pertinent films in PACS and No fractures or dislocations  No soft tissue abnormalities  EKG, Pathology, and Other Studies: I have personally reviewed pertinent reports

## 2018-08-05 LAB
ANION GAP SERPL CALCULATED.3IONS-SCNC: 7 MMOL/L (ref 4–13)
BASOPHILS # BLD AUTO: 0.04 THOUSANDS/ΜL (ref 0–0.1)
BASOPHILS NFR BLD AUTO: 1 % (ref 0–1)
BUN SERPL-MCNC: 12 MG/DL (ref 5–25)
CALCIUM SERPL-MCNC: 8.7 MG/DL (ref 8.3–10.1)
CHLORIDE SERPL-SCNC: 102 MMOL/L (ref 100–108)
CO2 SERPL-SCNC: 28 MMOL/L (ref 21–32)
CREAT SERPL-MCNC: 0.68 MG/DL (ref 0.6–1.3)
EOSINOPHIL # BLD AUTO: 0.16 THOUSAND/ΜL (ref 0–0.61)
EOSINOPHIL NFR BLD AUTO: 3 % (ref 0–6)
ERYTHROCYTE [DISTWIDTH] IN BLOOD BY AUTOMATED COUNT: 11.8 % (ref 11.6–15.1)
GFR SERPL CREATININE-BSD FRML MDRD: 104 ML/MIN/1.73SQ M
GLUCOSE SERPL-MCNC: 107 MG/DL (ref 65–140)
HCT VFR BLD AUTO: 38.4 % (ref 36.5–49.3)
HGB BLD-MCNC: 12.9 G/DL (ref 12–17)
IMM GRANULOCYTES # BLD AUTO: 0.07 THOUSAND/UL (ref 0–0.2)
IMM GRANULOCYTES NFR BLD AUTO: 1 % (ref 0–2)
LYMPHOCYTES # BLD AUTO: 1.53 THOUSANDS/ΜL (ref 0.6–4.47)
LYMPHOCYTES NFR BLD AUTO: 24 % (ref 14–44)
MCH RBC QN AUTO: 35.3 PG (ref 26.8–34.3)
MCHC RBC AUTO-ENTMCNC: 33.6 G/DL (ref 31.4–37.4)
MCV RBC AUTO: 105 FL (ref 82–98)
MONOCYTES # BLD AUTO: 0.76 THOUSAND/ΜL (ref 0.17–1.22)
MONOCYTES NFR BLD AUTO: 12 % (ref 4–12)
NEUTROPHILS # BLD AUTO: 3.96 THOUSANDS/ΜL (ref 1.85–7.62)
NEUTS SEG NFR BLD AUTO: 59 % (ref 43–75)
NRBC BLD AUTO-RTO: 0 /100 WBCS
PLATELET # BLD AUTO: 187 THOUSANDS/UL (ref 149–390)
PMV BLD AUTO: 9.2 FL (ref 8.9–12.7)
POTASSIUM SERPL-SCNC: 3.9 MMOL/L (ref 3.5–5.3)
RBC # BLD AUTO: 3.65 MILLION/UL (ref 3.88–5.62)
SODIUM SERPL-SCNC: 137 MMOL/L (ref 136–145)
WBC # BLD AUTO: 6.52 THOUSAND/UL (ref 4.31–10.16)

## 2018-08-05 PROCEDURE — 99232 SBSQ HOSP IP/OBS MODERATE 35: CPT | Performed by: INTERNAL MEDICINE

## 2018-08-05 PROCEDURE — 80048 BASIC METABOLIC PNL TOTAL CA: CPT | Performed by: INTERNAL MEDICINE

## 2018-08-05 PROCEDURE — 99231 SBSQ HOSP IP/OBS SF/LOW 25: CPT | Performed by: ORTHOPAEDIC SURGERY

## 2018-08-05 PROCEDURE — 85025 COMPLETE CBC W/AUTO DIFF WBC: CPT | Performed by: INTERNAL MEDICINE

## 2018-08-05 RX ADMIN — LISINOPRIL 20 MG: 20 TABLET ORAL at 08:56

## 2018-08-05 RX ADMIN — LORAZEPAM 1 MG: 1 TABLET ORAL at 21:32

## 2018-08-05 RX ADMIN — SODIUM CHLORIDE 3 G: 900 INJECTION INTRAVENOUS at 08:55

## 2018-08-05 RX ADMIN — SODIUM CHLORIDE 3 G: 900 INJECTION INTRAVENOUS at 14:13

## 2018-08-05 RX ADMIN — ENOXAPARIN SODIUM 40 MG: 100 INJECTION SUBCUTANEOUS at 08:56

## 2018-08-05 RX ADMIN — LORAZEPAM 1 MG: 1 TABLET ORAL at 08:56

## 2018-08-05 RX ADMIN — LABETALOL 20 MG/4 ML (5 MG/ML) INTRAVENOUS SYRINGE 10 MG: at 22:49

## 2018-08-05 RX ADMIN — SODIUM CHLORIDE 3 G: 900 INJECTION INTRAVENOUS at 01:51

## 2018-08-05 RX ADMIN — LORAZEPAM 1 MG: 1 TABLET ORAL at 16:29

## 2018-08-05 RX ADMIN — SODIUM CHLORIDE 3 G: 900 INJECTION INTRAVENOUS at 20:11

## 2018-08-05 RX ADMIN — THIAMINE HCL TAB 100 MG 100 MG: 100 TAB at 08:56

## 2018-08-05 NOTE — CASE MANAGEMENT
Thank you,  Benitez Aqq  [facility] Utilization Review Department  Phone: [phone]; Fax [phone]  ATTENTION: The Network Utilization Review Department is now centralized for our 9 Facilities  Make a note that we have a new phone and fax numbers for our Department  Please call with any questions or concerns to [phone] and carefully follow the prompts so that you are directed to the right person  All voicemails are confidential  Fax any determinations, approvals, denials, and requests for initial or continue stay review clinical to [phone]  Due to HIGH CALL volume, it would be easier if you could please send faxed requests to expedite your requests and in part, help us provide discharge notifications faster     ===============================================================================    Initial Clinical Review    Admission: Date/Time/Statement: 8/3/18 @ 1509   Orders Placed This Encounter   Procedures    Inpatient Admission (expected length of stay for this patient is greater than two midnights)     Standing Status:   Standing     Number of Occurrences:   1     Order Specific Question:   Admitting Physician     Answer:   Rolando Herbert [[id number]]     Order Specific Question:   Level of Care     Answer:   Med Surg [[id number]]     Order Specific Question:   Estimated length of stay     Answer:   More than 2 Midnights     Order Specific Question:   Certification     Answer:   I certify that inpatient services are medically necessary for this patient for a duration of greater than two midnights  See H&P and MD Progress Notes for additional information about the patient's course of treatment           ED: Date/Time/Mode of Arrival:   ED Arrival Information     Expected Arrival Acuity Means of Arrival Escorted By Service Admission Type    - 8/3/2018 13:42 Urgent Walk-In Self General Medicine Urgent    Arrival Complaint    infected cat bite rt hand          Chief Complaint:   Chief Complaint Patient presents with   [address] [place] [address]     Patient states he was bit by his sisters cat on Monday and seen at the Urgent care on Tuesday  Area of redness outlined and less red hbut he staets his hand is more swollen  Patient placed on Augmentin       History of Illness:   Mariluz Esqueda is a 59y o  year old male who presents with cat bite of the right hand  Patient was bit by his cat on Tuesday  The next day 2 days ago he went to the Urgent Center was placed on Augmentin  In spite of this he has developed increasing pain swelling particularly over the dorsum of the right hand  He also has had some fever and chills       ED Vital Signs:   ED Triage Vitals   Temperature Pulse Respirations Blood Pressure SpO2   08/03/18 1354 08/03/18 1354 08/03/18 1354 08/03/18 1354 08/03/18 1354   99 6 °F (37 6 °C) (!) 116 20 (!) 190/120 94 %      Temp Source Heart Rate Source Patient Position - Orthostatic VS BP Location FiO2 (%)   08/03/18 1910 08/03/18 2235 08/03/18 1910 08/03/18 1910 --   Oral Monitor Lying Left arm       Pain Score       08/03/18 1354       7        Wt Readings from Last 1 Encounters:   08/05/18 91 7 kg (202 lb 2 6 oz)     Abnormal Labs/Diagnostic Test Results:    WBC 08/03/2018 12 63*    RBC 08/03/2018 4 41     Hemoglobin 08/03/2018 15 7     Hematocrit 08/03/2018 44 3       Sodium 08/03/2018 135*    Potassium 08/03/2018 3 3*    Chloride 08/03/2018 99*    CO2 08/03/2018 25     Anion Gap 08/03/2018 11     BUN 08/03/2018 12     Creatinine 08/03/2018 0 95     Glucose 08/03/2018 138     Calcium 08/03/2018 9 4     AST 08/03/2018 12     ALT 08/03/2018 21     Alkaline Phosphatase 08/03/2018 120*    Total Protein 08/03/2018 8 1     Albumin 08/03/2018 3 4*    Total Bilirubin 08/03/2018 0 70     eGFR 08/03/2018 87     LACTIC ACID 08/03/2018 0 9      Right forearm X:  Dorsal soft tissue swelling at the distal forearm   No teeth fragments demonstrated   No underlying bony destruction to suggest osteomyelitis  ED Treatment:   Medication Administration from 08/03/2018 1342 to 08/03/2018 1903    Date/Time Order Dose Route Action   08/03/2018 1436 ampicillin-sulbactam (UNASYN) 3 g in sodium chloride 0 9 % 100 mL IVPB 3 g Intravenous Given   08/03/2018 1449 sodium chloride 0 9 % bolus 1,000 mL 1,000 mL Intravenous Given   08/03/2018 1745 dextrose 5 % and sodium chloride 0 9 % with KCl 20 mEq/L infusion (premix) 100 mL/hr Intravenous Given   08/03/2018 1718 lisinopril (ZESTRIL) tablet 20 mg 20 mg Oral Given          Past Medical/Surgical History: None    Admitting Diagnosis: Cellulitis [L03 90]  Infected cat bite of forearm, right, subsequent encounter [S51 851D, L08 9, W55 01XD]    Age/Sex: 59 y o  male    Assessment/Plan:   Principal Problem:    Cat bite of forearm, right, sequela  Active Problems:    Hypertension    Alcohol consumption of one to four drinks per day      Plan:  1306 Located within [facility] floor  IV antibiotics orthopedic consult  Patient may need I and D of hand  I am concerned about development of compartment syndrome  Pain control     Will place on blood pressure medications      Placed empirically on Ativan and thiamin  Patient claims he has stopped drinking the past without problems      Admission Orders:  Scheduled Meds:   Current Facility-Administered Medications:  acetaminophen 650 mg Oral Q6H PRN    ampicillin-sulbactam 3 g Intravenous Q6H Last Rate: 3 g (08/05/18 0855)   enoxaparin 40 mg Subcutaneous Daily    labetalol 10 mg Intravenous Q4H PRN    lisinopril 20 mg Oral Daily    LORazepam 1 mg Oral TID    morphine injection 2 mg Intravenous Q4H PRN    nicotine 1 patch Transdermal Daily    ondansetron 4 mg Intravenous Once    oxyCODONE-acetaminophen 1 tablet Oral Q6H PRN    thiamine 100 mg Oral Daily

## 2018-08-05 NOTE — PROGRESS NOTES
Progress Note - Orthopedics   Sofya Bell 59 y o  male MRN: [id number]  Unit/Bed#: [address] [place] [address] 205-02 Encounter: [id number]        Subjective:  Right forearm cat bite  Patient feels that he has made significant improvement  He has been working on ranging his fingers hand and wrist  Denies any fevers or chills  Denies nausea and vomiting  Vitals: Blood pressure 133/99, pulse 65, temperature 98 4 °F (36 9 °C), temperature source Oral, resp  rate 17, height 5' 11" (1 803 m), weight 91 7 kg (202 lb 2 6 oz), SpO2 96 %  ,Body mass index is 28 2 kg/m²  Intake/Output Summary (Last 24 hours) at 08/05/18 1051  Last data filed at 08/05/18 0744   Gross per 24 hour   Intake                0 ml   Output              850 ml   Net             -850 ml         Physical Exam:  right upper:   alert and ortiented, BCRx5, EPL/FPL/Abd/Add intcat, SITLT M/R/U and Patient with improvement in his overall swelling  Still has some edema of his dorsal hand area, much less tender, no fluctuance, resolved erythema, forearm is soft with minimal swelling  Lab, Imaging and other studies:   I have personally reviewed pertinent lab results  CBC:   Lab Results   Component Value Date    WBC 6 52 08/05/2018    HGB 12 9 08/05/2018    HCT 38 4 08/05/2018     (H) 08/05/2018     08/05/2018    MCH 35 3 (H) 08/05/2018    MCHC 33 6 08/05/2018    RDW 11 8 08/05/2018    MPV 9 2 08/05/2018    NRBC 0 08/05/2018         Assessment:   A 59 y o  y/o male with right forearm cellulitis following a cat bite  Plan:  Patient shows significant improvement in the last 24 hours  We will continue antibiotics  Compression dressing on the hand to help with the swelling  Continue range of motion of the hand  Elevation  No surgical indication at this time  Orthopedics will follow up patient is in the hospital   Stable for discharge from an orthopedic standpoint          Bishop Mathieu MD

## 2018-08-05 NOTE — PROGRESS NOTES
Progress Note - Elizabeth Londono 59 y o  male MRN: [id number]    Unit/Bed#: [address] [place] [address] 205-02 Encounter: [id number]      Assessment:    Principal Problem:    Cat bite of forearm, right, sequela  Active Problems:    Hypertension    Alcohol consumption of one to four drinks per day    Hypokalemia    Hyponatremia  Resolved Problems:    * No resolved hospital problems  *      Plan:  Continue IV antibiotics 1 more day  I wonder whether not some compression on the hand would improve the edema  Such as an Ace bandage    Subjective:   Less pain and hand    Objective:     Vitals: Blood pressure 133/99, pulse 65, temperature 98 4 °F (36 9 °C), temperature source Oral, resp  rate 17, height 5' 11" (1 803 m), weight 91 7 kg (202 lb 2 6 oz), SpO2 96 %  ,Body mass index is 28 2 kg/m²  Intake/Output Summary (Last 24 hours) at 08/05/18 0825  Last data filed at 08/05/18 0744   Gross per 24 hour   Intake                0 ml   Output             1350 ml   Net            -1350 ml       Physical Exam:    Alert and awake in no acute distress  Extremities:  Clearly less erythema and soft tissue swelling right upper extremity  Neuro: alert, no facial asymmetry,  not tremulous      Invasive Devices     Peripheral Intravenous Line            Peripheral IV 08/03/18 Left Antecubital 1 day                            Lab, Imaging and other studies: I have personally reviewed pertinent reports         Results from last 7 days  Lab Units 08/05/18 0437 08/04/18  0547 08/03/18  1425   WBC Thousand/uL 6 52 7 81 12 63*   HEMOGLOBIN g/dL 12 9 12 9 15 7   HEMATOCRIT % 38 4 38 8 44 3   PLATELETS Thousands/uL 187 170 181   NEUTROS PCT % 59  --  87*   LYMPHS PCT % 24  --  5*   MONOS PCT % 12  --  7   EOS PCT % 3  --  0       Results from last 7 days  Lab Units 08/05/18  0437 08/04/18  0547 08/03/18  1425   SODIUM mmol/L 137 137 135*   POTASSIUM mmol/L 3 9 3 2* 3 3*   CHLORIDE mmol/L 102 103 99*   CO2 mmol/L 28 25 25   BUN mg/dL 12 11 12   CREATININE mg/dL 0 68 0 64 0 95   CALCIUM mg/dL 8 7 8 3 9 4   TOTAL PROTEIN g/dL  --   --  8 1   BILIRUBIN TOTAL mg/dL  --   --  0 70   ALK PHOS U/L  --   --  120*   ALT U/L  --   --  21   AST U/L  --   --  12   GLUCOSE RANDOM mg/dL 107 129 138     No results found for: TROPONINI, CKMB, CKTOTAL      Lab Results   Component Value Date    BLOODCX No Growth at 24 hrs  08/03/2018    BLOODCX No Growth at 24 hrs  08/03/2018       Imaging:  No results found for this or any previous visit  No results found for this or any previous visit  PATIENT CENTERED ROUNDS: I have performed rounds with the nursing staff  This note has been constructed using a voice recognition system      Dori Weems MD

## 2018-08-06 VITALS
TEMPERATURE: 98.7 F | OXYGEN SATURATION: 98 % | DIASTOLIC BLOOD PRESSURE: 100 MMHG | RESPIRATION RATE: 18 BRPM | BODY MASS INDEX: 28.3 KG/M2 | HEART RATE: 68 BPM | SYSTOLIC BLOOD PRESSURE: 168 MMHG | WEIGHT: 202.16 LBS | HEIGHT: 71 IN

## 2018-08-06 PROBLEM — E87.6 HYPOKALEMIA: Status: RESOLVED | Noted: 2018-08-03 | Resolved: 2018-08-06

## 2018-08-06 PROBLEM — S51.851S: Status: RESOLVED | Noted: 2018-08-03 | Resolved: 2018-08-06
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
## 2018-08-06 PROBLEM — Z78.9 ALCOHOL CONSUMPTION OF ONE TO FOUR DRINKS PER DAY: Status: RESOLVED | Noted: 2018-08-03 | Resolved: 2018-08-06

## 2018-08-06 PROCEDURE — 99231 SBSQ HOSP IP/OBS SF/LOW 25: CPT | Performed by: PHYSICIAN ASSISTANT

## 2018-08-06 PROCEDURE — 99239 HOSP IP/OBS DSCHRG MGMT >30: CPT | Performed by: INTERNAL MEDICINE

## 2018-08-06 RX ORDER — AMOXICILLIN AND CLAVULANATE POTASSIUM 875; 125 MG/1; MG/1
1 TABLET, FILM COATED ORAL EVERY 12 HOURS SCHEDULED
Qty: 20 TABLET | Refills: 0 | Status: CANCELLED | OUTPATIENT
Start: 2018-08-06 | End: 2018-08-16

## 2018-08-06 RX ORDER — NICOTINE 21 MG/24HR
1 PATCH, TRANSDERMAL 24 HOURS TRANSDERMAL DAILY
Qty: 28 PATCH | Refills: 0 | Status: SHIPPED | OUTPATIENT
Start: 2018-08-07 | End: 2019-05-03 | Stop reason: ALTCHOICE

## 2018-08-06 RX ORDER — LISINOPRIL 20 MG/1
20 TABLET ORAL DAILY
Qty: 30 TABLET | Refills: 0 | Status: SHIPPED | OUTPATIENT
Start: 2018-08-07 | End: 2019-03-04 | Stop reason: SDUPTHER

## 2018-08-06 RX ORDER — AMOXICILLIN AND CLAVULANATE POTASSIUM 875; 125 MG/1; MG/1
1 TABLET, FILM COATED ORAL 2 TIMES DAILY
Qty: 20 TABLET | Refills: 0 | Status: SHIPPED | OUTPATIENT
Start: 2018-08-06 | End: 2018-08-16

## 2018-08-06 RX ADMIN — LORAZEPAM 1 MG: 1 TABLET ORAL at 08:27

## 2018-08-06 RX ADMIN — LISINOPRIL 20 MG: 20 TABLET ORAL at 08:27

## 2018-08-06 RX ADMIN — SODIUM CHLORIDE 3 G: 900 INJECTION INTRAVENOUS at 01:34

## 2018-08-06 RX ADMIN — SODIUM CHLORIDE 3 G: 900 INJECTION INTRAVENOUS at 08:32

## 2018-08-06 RX ADMIN — THIAMINE HCL TAB 100 MG 100 MG: 100 TAB at 08:27

## 2018-08-06 RX ADMIN — ENOXAPARIN SODIUM 40 MG: 100 INJECTION SUBCUTANEOUS at 08:27

## 2018-08-06 NOTE — SOCIAL WORK
Met with Pt  Pt presents AA&Ox3  Discussed role of   Pt lives with sister, mother and great niece in [address] [place] [address],  [place]  Pt is independent with adls and ambulation  No DME  Pt drives  Pt's sister does grocery shopping  Pt goes to [address] on [address]	[place]  Pt has no health insurance  PATHS aware of referral to meet with Pt to fill out medical assistance paperwork  Plan to return home upon discharge

## 2018-08-06 NOTE — DISCHARGE SUMMARY
Discharge Summary - Isabel Gannon 59 y o  male MRN: [id number]  Unit/Bed#: [address] [place] [address] Encounter: [id number]    Admission Date:    8/3/2018   Discharge Date:   08/06/18   Admitting Diagnosis:   Cellulitis [L03 90]  Infected cat bite of forearm, right, subsequent encounter [S51 851D, L08 9, W55 01XD]  Admitting Provider:   Cori Golden MD  Discharge Provider:   Kaylene Patel MD     Primary Care Physician at Discharge:   No primary care provider on file  ,None    HPI:   61-year-old male presented to emergency department with right hand swelling, erythema after a cat bite  For a detailed HPI please refer to the admission note  Procedures Performed:   No orders of the defined types were placed in this encounter  Hospital Course:   Patient was admitted with cellulitis of right forearm following a cat bite  Patient was started on IV Unasyn  Patient was seen by Orthopedics who monitored the patient and recommended continue IV antibiotics  As per Orthopedics there is no surgical indication for I and D this time  They cleared the patient from their standpoint for discharge  Patient is feeling much better and swelling and erythema has improved  Motor and sensation is intact  Patient blood culture remained negative to date and he remained afebrile  Patient is ambulating freely in the hallway without any discomfort  Patient is hemodynamically stable for discharge  Follow-up with PCP in 1 week  Follow-up with orthopedics as outpatient  Return to ER with any fever, chills, worsening swelling of right forearm or any other alarming symptoms      Consulting Providers   None     Complications:  None    Labs:   Lab Results   Component Value Date    WBC 6 52 08/05/2018    RBC 3 65 (L) 08/05/2018    HGB 12 9 08/05/2018    HCT 38 4 08/05/2018     (H) 08/05/2018    MCH 35 3 (H) 08/05/2018    RDW 11 8 08/05/2018     08/05/2018     Lab Results   Component Value Date    CREATININE 0 68 08/05/2018 BUN 12 08/05/2018     08/05/2018    K 3 9 08/05/2018     08/05/2018    CO2 28 08/05/2018    GLUCOSE 107 08/05/2018    PROT 8 1 08/03/2018    ALKPHOS 120 (H) 08/03/2018    ALT 21 08/03/2018    AST 12 08/03/2018       Treatments:  IV hydration and Unasyn, Zestril, Ativan, thiamine, nicotine patch  Discharge Diagnosis:   Principal Problem:    Cat bite of forearm, right, sequela  Active Problems:    Hypertension    Alcohol consumption of one to four drinks per day    Hypokalemia    Hyponatremia  Resolved Problems:    * No resolved hospital problems  *      Condition at Discharge:   Good     Code Status: Level 1 - Full Code  Advance Directive and Living Will: <no information>  Power of :    POLST:      Discharge instructions/Information to patient and family:   See after visit summary for information provided to patient and family  Provisions for Follow-Up Care:  See after visit summary for information related to follow-up care and any pertinent home health orders  Disposition:   Home    Planned Readmission:   No    Discharge Statement   I spent 35 minutes discharging the patient  This time was spent on the day of discharge  I had direct contact with the patient on the day of discharge  Greater than 50% of the total time was spent examining patient, answering all patient questions, arranging and discussing plan of care with patient as well as directly providing post-discharge instructions  Additional time then spent on discharge activities  Discharge Medications:  See after visit summary for reconciled discharge medications provided to patient and family        "This note has been constructed using a voice recognition system"    Kaylene Patel MD  8/6/2018,11:34 AM

## 2018-08-06 NOTE — PROGRESS NOTES
Progress Note - Orthopedics   Kalyn Meals 59 y o  male MRN: [id number]  Unit/Bed#: [address] [place] [address] 205-02 Encounter: [id number]        Subjective:  Right forearm cat bite  He continues to feel significant improvement compared to yesterday  He has been working on ranging his fingers hand and wrist  Denies any fevers or chills  Denies nausea and vomiting  Vitals: Blood pressure 168/100, pulse 68, temperature 98 7 °F (37 1 °C), temperature source Oral, resp  rate 18, height 5' 11" (1 803 m), weight 91 7 kg (202 lb 2 6 oz), SpO2 98 %  ,Body mass index is 28 2 kg/m²  Intake/Output Summary (Last 24 hours) at 08/06/18 0954  Last data filed at 08/06/18 0835   Gross per 24 hour   Intake                0 ml   Output             2750 ml   Net            -2750 ml         Physical Exam:  right upper:   A&O x 3 NAD  Right forearm:  FROM wrist and fingers  No erythema  Swelling much improved in forearm  Slight swelling of fingers  Sensation intact to light touch  Motor intact throughout  Lab, Imaging and other studies:   I have personally reviewed pertinent lab results  CBC:   No results found for: WBC, HGB, HCT, MCV, PLT, ADJUSTEDWBC, MCH, MCHC, RDW, MPV, NRBC      Assessment:   A 59 y o  y/o male with right forearm cellulitis following a cat bite  Plan:  Patient shows significant improvement in the last 24 hours  Continue antibiotics as per primary team   Compression dressing no longer needed if swelling does not return  Continue range of motion of the hand  Elevation  No surgical indication at this time  Orthopedics signing off  Stable for discharge from an orthopedic standpoint  Follow up as outpatient as needed          Alphonso Desouza PA-C

## 2018-08-06 NOTE — PLAN OF CARE
DISCHARGE PLANNING     Discharge to home or other facility with appropriate resources Adequate for Discharge        INFECTION - ADULT     Absence or prevention of progression during hospitalization Adequate for Discharge        MUSCULOSKELETAL - ADULT     Maintain proper alignment of affected body part Adequate for Discharge        PAIN - ADULT     Verbalizes/displays adequate comfort level or baseline comfort level Adequate for Discharge        Potential for Falls     Patient will remain free of falls Adequate for Discharge        SKIN/TISSUE INTEGRITY - ADULT     Skin integrity remains intact Adequate for Discharge     Incision(s), wounds(s) or drain site(s) healing without S/S of infection Adequate for Discharge

## 2018-08-06 NOTE — PLAN OF CARE
DISCHARGE PLANNING     Discharge to home or other facility with appropriate resources Progressing        INFECTION - ADULT     Absence or prevention of progression during hospitalization Progressing        MUSCULOSKELETAL - ADULT     Maintain proper alignment of affected body part Progressing        PAIN - ADULT     Verbalizes/displays adequate comfort level or baseline comfort level Progressing        Potential for Falls     Patient will remain free of falls Progressing        SKIN/TISSUE INTEGRITY - ADULT     Skin integrity remains intact Progressing     Incision(s), wounds(s) or drain site(s) healing without S/S of infection Progressing

## 2018-08-08 LAB
BACTERIA BLD CULT: NORMAL
BACTERIA BLD CULT: NORMAL

## 2019-03-04 ENCOUNTER — APPOINTMENT (EMERGENCY)
Dept: NON INVASIVE DIAGNOSTICS | Facility: HOSPITAL | Age: 59
End: 2019-03-04
Payer: COMMERCIAL

## 2019-03-04 ENCOUNTER — HOSPITAL ENCOUNTER (EMERGENCY)
Facility: HOSPITAL | Age: 59
Discharge: HOME | End: 2019-03-04
Attending: EMERGENCY MEDICINE
Payer: COMMERCIAL

## 2019-03-04 VITALS
RESPIRATION RATE: 20 BRPM | SYSTOLIC BLOOD PRESSURE: 174 MMHG | WEIGHT: 200 LBS | TEMPERATURE: 98.9 F | DIASTOLIC BLOOD PRESSURE: 102 MMHG | OXYGEN SATURATION: 96 % | HEART RATE: 98 BPM | HEIGHT: 71 IN | BODY MASS INDEX: 28 KG/M2

## 2019-03-04 DIAGNOSIS — I10 UNCONTROLLED HYPERTENSION: ICD-10-CM

## 2019-03-04 DIAGNOSIS — M79.89 SWELLING OF LIMB: ICD-10-CM

## 2019-03-04 DIAGNOSIS — I10 ESSENTIAL HYPERTENSION: ICD-10-CM

## 2019-03-04 DIAGNOSIS — I87.2 DERMATITIS, STASIS: Primary | ICD-10-CM

## 2019-03-04 LAB
ALBUMIN SERPL BCP-MCNC: 3.4 G/DL (ref 3.5–5)
ALP SERPL-CCNC: 176 U/L (ref 46–116)
ALT SERPL W P-5'-P-CCNC: 16 U/L (ref 12–78)
ANION GAP SERPL CALCULATED.3IONS-SCNC: 8 MMOL/L (ref 4–13)
APTT PPP: 29 SECONDS (ref 26–38)
AST SERPL W P-5'-P-CCNC: 12 U/L (ref 5–45)
BASOPHILS # BLD AUTO: 0.06 THOUSANDS/ΜL (ref 0–0.1)
BASOPHILS NFR BLD AUTO: 1 % (ref 0–1)
BILIRUB SERPL-MCNC: 0.6 MG/DL (ref 0.2–1)
BUN SERPL-MCNC: 20 MG/DL (ref 5–25)
CALCIUM SERPL-MCNC: 9.3 MG/DL (ref 8.3–10.1)
CHLORIDE SERPL-SCNC: 100 MMOL/L (ref 100–108)
CO2 SERPL-SCNC: 33 MMOL/L (ref 21–32)
CREAT SERPL-MCNC: 0.79 MG/DL (ref 0.6–1.3)
EOSINOPHIL # BLD AUTO: 0.08 THOUSAND/ΜL (ref 0–0.61)
EOSINOPHIL NFR BLD AUTO: 1 % (ref 0–6)
ERYTHROCYTE [DISTWIDTH] IN BLOOD BY AUTOMATED COUNT: 12.6 % (ref 11.6–15.1)
GFR SERPL CREATININE-BSD FRML MDRD: 98 ML/MIN/1.73SQ M
GLUCOSE SERPL-MCNC: 101 MG/DL (ref 65–140)
HCT VFR BLD AUTO: 43.5 % (ref 36.5–49.3)
HGB BLD-MCNC: 14.7 G/DL (ref 12–17)
IMM GRANULOCYTES # BLD AUTO: 0.05 THOUSAND/UL (ref 0–0.2)
IMM GRANULOCYTES NFR BLD AUTO: 1 % (ref 0–2)
INR PPP: 0.99 (ref 0.86–1.17)
LYMPHOCYTES # BLD AUTO: 1.02 THOUSANDS/ΜL (ref 0.6–4.47)
LYMPHOCYTES NFR BLD AUTO: 11 % (ref 14–44)
MCH RBC QN AUTO: 34.2 PG (ref 26.8–34.3)
MCHC RBC AUTO-ENTMCNC: 33.8 G/DL (ref 31.4–37.4)
MCV RBC AUTO: 101 FL (ref 82–98)
MONOCYTES # BLD AUTO: 0.67 THOUSAND/ΜL (ref 0.17–1.22)
MONOCYTES NFR BLD AUTO: 7 % (ref 4–12)
NEUTROPHILS # BLD AUTO: 7.26 THOUSANDS/ΜL (ref 1.85–7.62)
NEUTS SEG NFR BLD AUTO: 79 % (ref 43–75)
NRBC BLD AUTO-RTO: 0 /100 WBCS
PLATELET # BLD AUTO: 199 THOUSANDS/UL (ref 149–390)
PMV BLD AUTO: 8.9 FL (ref 8.9–12.7)
POTASSIUM SERPL-SCNC: 3.4 MMOL/L (ref 3.5–5.3)
PROT SERPL-MCNC: 7.6 G/DL (ref 6.4–8.2)
PROTHROMBIN TIME: 12.5 SECONDS (ref 11.8–14.2)
RBC # BLD AUTO: 4.3 MILLION/UL (ref 3.88–5.62)
SODIUM SERPL-SCNC: 141 MMOL/L (ref 136–145)
WBC # BLD AUTO: 9.14 THOUSAND/UL (ref 4.31–10.16)

## 2019-03-04 PROCEDURE — 85025 COMPLETE CBC W/AUTO DIFF WBC: CPT | Performed by: EMERGENCY MEDICINE

## 2019-03-04 PROCEDURE — 85730 THROMBOPLASTIN TIME PARTIAL: CPT | Performed by: EMERGENCY MEDICINE

## 2019-03-04 PROCEDURE — 99284 EMERGENCY DEPT VISIT MOD MDM: CPT

## 2019-03-04 PROCEDURE — 36415 COLL VENOUS BLD VENIPUNCTURE: CPT | Performed by: EMERGENCY MEDICINE

## 2019-03-04 PROCEDURE — 93971 EXTREMITY STUDY: CPT

## 2019-03-04 PROCEDURE — 80053 COMPREHEN METABOLIC PANEL: CPT | Performed by: EMERGENCY MEDICINE

## 2019-03-04 PROCEDURE — 85610 PROTHROMBIN TIME: CPT | Performed by: EMERGENCY MEDICINE

## 2019-03-04 RX ORDER — LISINOPRIL 20 MG/1
20 TABLET ORAL DAILY
Qty: 30 TABLET | Refills: 0 | Status: SHIPPED | OUTPATIENT
Start: 2019-03-04 | End: 2019-05-03 | Stop reason: SDUPTHER

## 2019-03-04 NOTE — ED PROVIDER NOTES
History  Chief Complaint   Patient presents with    Cellulitis     Presents with C/O B/L lower leg irritation and swelling, LLE worse with cracks in skin and more swelling, clear-bloody drainage "from scratching it", started several months ago  Patient complains of bilateral lower legs swelling itchy rash about 3 months now  Patient does not have primary care physician but just got insurance  Did not try anything for symptoms  Prior to Admission Medications   Prescriptions Last Dose Informant Patient Reported? Taking?   ibuprofen (MOTRIN) 200 mg tablet 3/4/2019 at Unknown time  Yes Yes   Sig: Take by mouth every 6 (six) hours as needed for mild pain   lisinopril (ZESTRIL) 20 mg tablet 9/4/2018  No No   Sig: Take 1 tablet (20 mg total) by mouth daily for 30 days   lisinopril (ZESTRIL) 20 mg tablet   No No   Sig: Take 1 tablet (20 mg total) by mouth daily for 30 days   nicotine (NICODERM CQ) 14 mg/24hr TD 24 hr patch Not Taking at Unknown time  No No   Sig: Place 1 patch on the skin daily   Patient not taking: Reported on 3/4/2019      Facility-Administered Medications: None       Past Medical History:   Diagnosis Date    Hypertension        History reviewed  No pertinent surgical history  History reviewed  No pertinent family history  I have reviewed and agree with the history as documented  Social History     Tobacco Use    Smoking status: Current Every Day Smoker     Packs/day: 0 50    Smokeless tobacco: Never Used   Substance Use Topics    Alcohol use: Yes     Comment: every day    Drug use: No        Review of Systems   All other systems reviewed and are negative  Physical Exam  Physical Exam   Constitutional: He is oriented to person, place, and time  He appears well-developed and well-nourished  HENT:   Mouth/Throat: Oropharynx is clear and moist    Eyes: Pupils are equal, round, and reactive to light  Conjunctivae and EOM are normal    Neck: Normal range of motion   Neck supple  No spinous process tenderness present  Cardiovascular: Normal rate, regular rhythm, normal heart sounds and intact distal pulses  Pulmonary/Chest: Effort normal and breath sounds normal  No respiratory distress  He has no wheezes  Abdominal: Soft  Bowel sounds are normal  He exhibits no distension  There is no tenderness  Musculoskeletal: Normal range of motion  He exhibits edema  Left lower extremity with erythema, excoriations, moderate edema  NV intact, +1 DP and PT pulses  Right lower extremity NV intact, small petechia and dry scaly skin noted  Neurological: He is alert and oriented to person, place, and time  He has normal strength  No sensory deficit  GCS eye subscore is 4  GCS verbal subscore is 5  GCS motor subscore is 6  Skin: Skin is warm and dry  No rash noted  Psychiatric: He has a normal mood and affect  Nursing note and vitals reviewed        Vital Signs  ED Triage Vitals [03/04/19 1512]   Temperature Pulse Respirations Blood Pressure SpO2   98 7 °F (37 1 °C) 102 20 155/100 97 %      Temp Source Heart Rate Source Patient Position - Orthostatic VS BP Location FiO2 (%)   Temporal Monitor Sitting Right arm --      Pain Score       5           Vitals:    03/04/19 1512 03/04/19 1515 03/04/19 1722   BP: 155/100  (!) 174/102   Pulse: 102 (!) 107 98   Patient Position - Orthostatic VS: Sitting  Lying       Visual Acuity      ED Medications  Medications - No data to display    Diagnostic Studies  Results Reviewed     Procedure Component Value Units Date/Time    Comprehensive metabolic panel [[id number]]  (Abnormal) Collected:  03/04/19 1615    Lab Status:  Final result Specimen:  Blood from Arm, Left Updated:  03/04/19 1641     Sodium 141 mmol/L      Potassium 3 4 mmol/L      Chloride 100 mmol/L      CO2 33 mmol/L      ANION GAP 8 mmol/L      BUN 20 mg/dL      Creatinine 0 79 mg/dL      Glucose 101 mg/dL      Calcium 9 3 mg/dL      AST 12 U/L      ALT 16 U/L      Alkaline Phosphatase 176 U/L      Total Protein 7 6 g/dL      Albumin 3 4 g/dL      Total Bilirubin 0 60 mg/dL      eGFR 98 ml/min/1 73sq m     Narrative:       National Kidney Disease Education Program recommendations are as follows:  GFR calculation is accurate only with a steady state creatinine  Chronic Kidney disease less than 60 ml/min/1 73 sq  meters  Kidney failure less than 15 ml/min/1 73 sq  meters      Protime-INR [[id number]]  (Normal) Collected:  03/04/19 1615    Lab Status:  Final result Specimen:  Blood from Arm, Left Updated:  03/04/19 1637     Protime 12 5 seconds      INR 0 99    APTT [[id number]]  (Normal) Collected:  03/04/19 1615    Lab Status:  Final result Specimen:  Blood from Arm, Left Updated:  03/04/19 1637     PTT 29 seconds     CBC and differential [[id number]]  (Abnormal) Collected:  03/04/19 1615    Lab Status:  Final result Specimen:  Blood from Arm, Left Updated:  03/04/19 1625     WBC 9 14 Thousand/uL      RBC 4 30 Million/uL      Hemoglobin 14 7 g/dL      Hematocrit 43 5 %       fL      MCH 34 2 pg      MCHC 33 8 g/dL      RDW 12 6 %      MPV 8 9 fL      Platelets 791 Thousands/uL      nRBC 0 /100 WBCs      Neutrophils Relative 79 %      Immat GRANS % 1 %      Lymphocytes Relative 11 %      Monocytes Relative 7 %      Eosinophils Relative 1 %      Basophils Relative 1 %      Neutrophils Absolute 7 26 Thousands/µL      Immature Grans Absolute 0 05 Thousand/uL      Lymphocytes Absolute 1 02 Thousands/µL      Monocytes Absolute 0 67 Thousand/µL      Eosinophils Absolute 0 08 Thousand/µL      Basophils Absolute 0 06 Thousands/µL                  VAS lower limb venous duplex study, unilateral/limited    (Results Pending)              Procedures  Procedures       Phone Contacts  ED Phone Contact    ED Course                               MDM  Number of Diagnoses or Management Options  Dermatitis, stasis: new and requires workup  Uncontrolled hypertension: new and requires workup     Amount and/or Complexity of Data Reviewed  Clinical lab tests: ordered and reviewed  Tests in the radiology section of CPT®: ordered and reviewed    Patient Progress  Patient progress: improved      Disposition  Final diagnoses:   Dermatitis, stasis   Uncontrolled hypertension     Time reflects when diagnosis was documented in both MDM as applicable and the Disposition within this note     Time User Action Codes Description Comment    3/4/2019  4:14 PM Paulie Samayoa Add [M79 89] Swelling of limb     3/4/2019  5:06 PM Adrianna Gonzales Add [I87 2] Dermatitis, stasis     3/4/2019  5:06 PM Teodoro Cisneros Uncontrolled hypertension     3/4/2019  5:06 PM Teodoro Cisneros Essential hypertension     3/4/2019  5:06 PM Blayne, [address] [facility] Essential hypertension       ED Disposition     ED Disposition Condition Date/Time Comment    Discharge Stable Mon Mar 4, 2019  5:06 PM Shannan Tomlinson discharge to home/self care  Follow-up Information     Follow up With Specialties Details Why Contact Info    [facility]  Call in 1 day  [phone]            Discharge Medication List as of 3/4/2019  5:07 PM      CONTINUE these medications which have CHANGED    Details   lisinopril (ZESTRIL) 20 mg tablet Take 1 tablet (20 mg total) by mouth daily for 30 days, Starting Mon 3/4/2019, Until Wed 4/3/2019, Normal         CONTINUE these medications which have NOT CHANGED    Details   ibuprofen (MOTRIN) 200 mg tablet Take by mouth every 6 (six) hours as needed for mild pain, Historical Med      nicotine (NICODERM CQ) 14 mg/24hr TD 24 hr patch Place 1 patch on the skin daily, Starting Tue 8/7/2018, Normal           No discharge procedures on file      ED Provider  Electronically Signed by           Lorna Conde DO  03/04/19 1938

## 2019-03-05 PROCEDURE — 93971 EXTREMITY STUDY: CPT | Performed by: INTERNAL MEDICINE

## 2019-05-03 ENCOUNTER — OFFICE VISIT (OUTPATIENT)
Dept: FAMILY MEDICINE CLINIC | Facility: HOSPITAL | Age: 59
End: 2019-05-03
Payer: COMMERCIAL

## 2019-05-03 VITALS
DIASTOLIC BLOOD PRESSURE: 90 MMHG | SYSTOLIC BLOOD PRESSURE: 130 MMHG | HEIGHT: 71 IN | HEART RATE: 92 BPM | WEIGHT: 181.5 LBS | TEMPERATURE: 97.5 F | OXYGEN SATURATION: 96 % | BODY MASS INDEX: 25.41 KG/M2

## 2019-05-03 DIAGNOSIS — I87.2 VENOUS STASIS DERMATITIS OF LEFT LOWER EXTREMITY: ICD-10-CM

## 2019-05-03 DIAGNOSIS — I10 ESSENTIAL HYPERTENSION: ICD-10-CM

## 2019-05-03 DIAGNOSIS — L03.116 CELLULITIS OF LEFT LOWER EXTREMITY: Primary | ICD-10-CM

## 2019-05-03 PROCEDURE — 99203 OFFICE O/P NEW LOW 30 MIN: CPT | Performed by: PHYSICIAN ASSISTANT

## 2019-05-03 RX ORDER — LISINOPRIL 20 MG/1
20 TABLET ORAL DAILY
Qty: 90 TABLET | Refills: 3 | Status: SHIPPED | OUTPATIENT
Start: 2019-05-03 | End: 2019-11-01 | Stop reason: SDUPTHER

## 2019-05-03 RX ORDER — CEPHALEXIN 500 MG/1
500 CAPSULE ORAL
Qty: 21 CAPSULE | Refills: 0 | Status: SHIPPED | OUTPATIENT
Start: 2019-05-03 | End: 2019-05-10

## 2019-05-31 ENCOUNTER — OFFICE VISIT (OUTPATIENT)
Dept: FAMILY MEDICINE CLINIC | Facility: HOSPITAL | Age: 59
End: 2019-05-31
Payer: COMMERCIAL

## 2019-05-31 VITALS
HEIGHT: 71 IN | HEART RATE: 87 BPM | BODY MASS INDEX: 25.48 KG/M2 | DIASTOLIC BLOOD PRESSURE: 70 MMHG | SYSTOLIC BLOOD PRESSURE: 110 MMHG | WEIGHT: 182 LBS

## 2019-05-31 DIAGNOSIS — L03.116 CELLULITIS OF LEFT LOWER EXTREMITY: Primary | ICD-10-CM

## 2019-05-31 DIAGNOSIS — I87.2 VENOUS STASIS DERMATITIS OF LEFT LOWER EXTREMITY: ICD-10-CM

## 2019-05-31 DIAGNOSIS — I10 ESSENTIAL HYPERTENSION: ICD-10-CM

## 2019-05-31 PROCEDURE — 3078F DIAST BP <80 MM HG: CPT | Performed by: PHYSICIAN ASSISTANT

## 2019-05-31 PROCEDURE — 3074F SYST BP LT 130 MM HG: CPT | Performed by: PHYSICIAN ASSISTANT

## 2019-05-31 PROCEDURE — 99213 OFFICE O/P EST LOW 20 MIN: CPT | Performed by: PHYSICIAN ASSISTANT

## 2019-05-31 PROCEDURE — 3008F BODY MASS INDEX DOCD: CPT | Performed by: PHYSICIAN ASSISTANT

## 2019-05-31 PROCEDURE — 4004F PT TOBACCO SCREEN RCVD TLK: CPT | Performed by: PHYSICIAN ASSISTANT

## 2019-11-01 ENCOUNTER — HOSPITAL ENCOUNTER (OUTPATIENT)
Dept: RADIOLOGY | Facility: HOSPITAL | Age: 59
Discharge: HOME | End: 2019-11-01
Payer: COMMERCIAL

## 2019-11-01 ENCOUNTER — OFFICE VISIT (OUTPATIENT)
Dept: FAMILY MEDICINE CLINIC | Facility: HOSPITAL | Age: 59
End: 2019-11-01
Payer: COMMERCIAL

## 2019-11-01 VITALS
DIASTOLIC BLOOD PRESSURE: 70 MMHG | BODY MASS INDEX: 26.07 KG/M2 | SYSTOLIC BLOOD PRESSURE: 142 MMHG | WEIGHT: 186.2 LBS | HEART RATE: 74 BPM | OXYGEN SATURATION: 98 % | HEIGHT: 71 IN

## 2019-11-01 DIAGNOSIS — F10.11 HISTORY OF ALCOHOL ABUSE: ICD-10-CM

## 2019-11-01 DIAGNOSIS — Z87.828 HISTORY OF INJURY: ICD-10-CM

## 2019-11-01 DIAGNOSIS — Z23 NEED FOR INFLUENZA VACCINATION: ICD-10-CM

## 2019-11-01 DIAGNOSIS — Z12.11 COLON CANCER SCREENING: ICD-10-CM

## 2019-11-01 DIAGNOSIS — E87.1 HYPONATREMIA: ICD-10-CM

## 2019-11-01 DIAGNOSIS — I87.2 VENOUS STASIS DERMATITIS OF LEFT LOWER EXTREMITY: ICD-10-CM

## 2019-11-01 DIAGNOSIS — Z00.00 HEALTHCARE MAINTENANCE: Primary | ICD-10-CM

## 2019-11-01 DIAGNOSIS — I10 ESSENTIAL HYPERTENSION: ICD-10-CM

## 2019-11-01 PROCEDURE — 90682 RIV4 VACC RECOMBINANT DNA IM: CPT

## 2019-11-01 PROCEDURE — 73562 X-RAY EXAM OF KNEE 3: CPT

## 2019-11-01 PROCEDURE — 90471 IMMUNIZATION ADMIN: CPT

## 2019-11-01 PROCEDURE — 99386 PREV VISIT NEW AGE 40-64: CPT | Performed by: PHYSICIAN ASSISTANT

## 2019-11-01 RX ORDER — LISINOPRIL 20 MG/1
20 TABLET ORAL DAILY
Qty: 90 TABLET | Refills: 3 | Status: SHIPPED | OUTPATIENT
Start: 2019-11-01 | End: 2020-01-13 | Stop reason: SDUPTHER

## 2019-11-01 NOTE — PATIENT INSTRUCTIONS
Obesity   AMBULATORY CARE:   Obesity  is when your body mass index (BMI) is greater than 30  Your healthcare provider will use your height and weight to measure your BMI  The risks of obesity include  many health problems, such as injuries or physical disability  You may need tests to check for the following:  · Diabetes     · High blood pressure or high cholesterol     · Heart disease     · Gallbladder or liver disease     · Cancer of the colon, breast, prostate, liver, or kidney     · Sleep apnea     · Arthritis or gout  Seek care immediately if:   · You have a severe headache, confusion, or difficulty speaking  · You have weakness on one side of your body  · You have chest pain, sweating, or shortness of breath  Contact your healthcare provider if:   · You have symptoms of gallbladder or liver disease, such as pain in your upper abdomen  · You have knee or hip pain and discomfort while walking  · You have symptoms of diabetes, such as intense hunger and thirst, and frequent urination  · You have symptoms of sleep apnea, such as snoring or daytime sleepiness  · You have questions or concerns about your condition or care  Treatment for obesity  focuses on helping you lose weight to improve your health  Even a small decrease in BMI can reduce the risk for many health problems  Your healthcare provider will help you set a weight-loss goal   · Lifestyle changes  are the first step in treating obesity  These include making healthy food choices and getting regular physical activity  Your healthcare provider may suggest a weight-loss program that involves coaching, education, and therapy  · Medicine  may help you lose weight when it is used with a healthy diet and physical activity  · Surgery  can help you lose weight if you are very obese and have other health problems  There are several types of weight-loss surgery  Ask your healthcare provider for more information    Be successful losing weight:   · Set small, realistic goals  An example of a small goal is to walk for 20 minutes 5 days a week  Anther goal is to lose 5% of your body weight  · Tell friends, family members, and coworkers about your goals  and ask for their support  Ask a friend to lose weight with you, or join a weight-loss support group  · Identify foods or triggers that may cause you to overeat , and find ways to avoid them  Remove tempting high-calorie foods from your home and workplace  Place a bowl of fresh fruit on your kitchen counter  If stress causes you to eat, then find other ways to cope with stress  · Keep a diary to track what you eat and drink  Also write down how many minutes of physical activity you do each day  Weigh yourself once a week and record it in your diary  Eating changes: You will need to eat 500 to 1,000 fewer calories each day than you currently eat to lose 1 to 2 pounds a week  The following changes will help you cut calories:  · Eat smaller portions  Use small plates, no larger than 9 inches in diameter  Fill your plate half full of fruits and vegetables  Measure your food using measuring cups until you know what a serving size looks like  · Eat 3 meals and 1 or 2 snacks each day  Plan your meals in advance  [facility] and eat at home most of the time  Eat slowly  · Eat fruits and vegetables at every meal   They are low in calories and high in fiber, which makes you feel full  Do not add butter, margarine, or cream sauce to vegetables  Use herbs to season steamed vegetables  · Eat less fat and fewer fried foods  Eat more baked or grilled chicken and fish  These protein sources are lower in calories and fat than red meat  Limit fast food  Dress your salads with olive oil and vinegar instead of bottled dressing  · Limit the amount of sugar you eat  Do not drink sugary beverages  Limit alcohol  Activity changes:  Physical activity is good for your body in many ways   It helps you burn calories and build strong muscles  It decreases stress and depression, and improves your mood  It can also help you sleep better  Talk to your healthcare provider before you begin an exercise program   · Exercise for at least 30 minutes 5 days a week  Start slowly  Set aside time each day for physical activity that you enjoy and that is convenient for you  It is best to do both weight training and an activity that increases your heart rate, such as walking, bicycling, or swimming  · Find ways to be more active  Do yard work and housecleaning  Walk up the stairs instead of using elevators  Spend your leisure time going to events that require walking, such as outdoor festivals or fairs  This extra physical activity can help you lose weight and keep it off  Follow up with your healthcare provider as directed: You may need to meet with a dietitian  Write down your questions so you remember to ask them during your visits  © 2017 [address] [employer] Information is for End User's use only and may not be sold, redistributed or otherwise used for commercial purposes  All illustrations and images included in CareNotes® are the copyrighted property of [employer] , [employer]  or [employer]  The above information is an  only  It is not intended as medical advice for individual conditions or treatments  Talk to your doctor, nurse or pharmacist before following any medical regimen to see if it is safe and effective for you  Weight Management   AMBULATORY CARE:   Why it is important to manage your weight:  Being overweight increases your risk of health conditions such as heart disease, high blood pressure, type 2 diabetes, and certain types of cancer  It can also increase your risk for osteoarthritis, sleep apnea, and other respiratory problems  Aim for a slow, steady weight loss  Even a small amount of weight loss can lower your risk of health problems    How to lose weight safely:  A safe and healthy way to lose weight is to eat fewer calories and get regular exercise  You can lose up about 1 pound a week by decreasing the number of calories you eat by 500 calories each day  You can decrease calories by eating smaller portion sizes or by cutting out high-calorie foods  Read labels to find out how many calories are in the foods you eat  You can also burn calories with exercise such as walking, swimming, or biking  You will be more likely to keep weight off if you make these changes part of your lifestyle  Healthy meal plan for weight management:  A healthy meal plan includes a variety of foods, contains fewer calories, and helps you stay healthy  A healthy meal plan includes the following:  · Eat whole-grain foods more often  A healthy meal plan should contain fiber  Fiber is the part of grains, fruits, and vegetables that is not broken down by your body  Whole-grain foods are healthy and provide extra fiber in your diet  Some examples of whole-grain foods are whole-wheat breads and pastas, oatmeal, brown rice, and bulgur  · Eat a variety of vegetables every day  Include dark, leafy greens such as spinach, kale, vikki greens, and mustard greens  Eat yellow and orange vegetables such as carrots, sweet potatoes, and winter squash  · Eat a variety of fruits every day  Choose fresh or canned fruit (canned in its own juice or light syrup) instead of juice  Fruit juice has very little or no fiber  · Eat low-fat dairy foods  Drink fat-free (skim) milk or 1% milk  Eat fat-free yogurt and low-fat cottage cheese  Try low-fat cheeses such as mozzarella and other reduced-fat cheeses  · Choose meat and other protein foods that are low in fat  Choose beans or other legumes such as split peas or lentils  Choose fish, skinless poultry (chicken or turkey), or lean cuts of red meat (beef or pork)  Before you cook meat or poultry, cut off any visible fat  · Use less fat and oil  Try baking foods instead of frying them  Add less fat, such as margarine, sour cream, regular salad dressing and mayonnaise to foods  Eat fewer high-fat foods  Some examples of high-fat foods include french fries, doughnuts, ice cream, and cakes  · Eat fewer sweets  Limit foods and drinks that are high in sugar  This includes candy, cookies, regular soda, and sweetened drinks  Ways to decrease calories:   · Eat smaller portions  ¨ Use a small plate with smaller servings  ¨ Do not eat second helpings  ¨ When you eat at a restaurant, ask for a box and place half of your meal in the box before you eat  ¨ Share an entrée with someone else  · Replace high-calorie snacks with healthy, low-calorie snacks  ¨ Choose fresh fruit, vegetables, fat-free rice cakes, or air-popped popcorn instead of potato chips, nuts, or chocolate  ¨ Choose water or calorie-free drinks instead of soda or sweetened drinks  · Eat regular meals  Skipping meals can lead to overeating later in the day  Eat a healthy snack in place of a meal if you do not have time to eat a regular meal      · Do not shop for groceries when you are hungry  You may be more likely to make unhealthy food choices  Take a grocery list of healthy foods and shop after you have eaten  Exercise:  Exercise at least 30 minutes per day on most days of the week  Some examples of exercise include walking, biking, dancing, and swimming  You can also fit in more physical activity by taking the stairs instead of the elevator or parking farther away from stores  Ask your healthcare provider about the best exercise plan for you  Other things to consider as you try to lose weight:   · Be aware of situations that may give you the urge to overeat, such as eating while watching television  Find ways to avoid these situations  For example, read a book, go for a walk, or do crafts      · Meet with a weight loss support group or friends who are also trying to lose weight  This may help you stay motivated to continue working on your weight loss goals  © 2017 [employer] [employer] Information is for End User's use only and may not be sold, redistributed or otherwise used for commercial purposes  All illustrations and images included in CareNotes® are the copyrighted property of [employer]  or [employer]  The above information is an  only  It is not intended as medical advice for individual conditions or treatments  Talk to your doctor, nurse or pharmacist before following any medical regimen to see if it is safe and effective for you  Recommend folic acid, and vitamin B complex  Recommend getting cologuard for colon cancer screening, and complete, fasting blood test   Monitor blood pressure  Return to office in 3 months

## 2019-11-01 NOTE — PROGRESS NOTES
Assessment/Plan:       Diagnoses and all orders for this visit:    Health care maintenance    Need for influenza vaccination  -     influenza vaccine, 2204-9362, quadrivalent, recombinant, PF, 0 5 mL, for patients 18 yr+ (FLUBLOK)    Colon cancer screening  -     Cologuard; Future     Overweight     Subjective:      Patient ID: Sapna Haddad is a 62 y o  male  62year old white male presents for physical    Has some aches and pains, lower back and knee pains from old injury  Patient admits to alcohol cessation since Labor day, end of August; stopped on own, has been to [facility] in the past   Was let go from job, July 2019; has been looking for work, and was out of insurance lately  Living with sister, and brother-in-law  Both sisters helping patient currently  Use to work in [address]  Admits to smoking 1/2 pack a day, denies drug use  Use to live in [place], skiing and working at [employer];   moved back to [place] 2015  Review of Systems   Constitutional: Negative for chills, diaphoresis, fatigue and fever  HENT: Positive for rhinorrhea  Negative for congestion, ear pain and sore throat  Eyes: Negative for pain, discharge and visual disturbance  Last eye exam-   2015; Has glasses  Respiratory: Positive for cough  Negative for shortness of breath  Gastrointestinal: Negative for abdominal pain, blood in stool, constipation, diarrhea, nausea and vomiting  Genitourinary: Negative for discharge, frequency, penile pain, penile swelling, scrotal swelling and testicular pain  Musculoskeletal: Positive for arthralgias, back pain and myalgias  Negative for neck pain and neck stiffness  Has left knee pain, from old injury  Neurological: Negative for dizziness, tremors, weakness, light-headedness, numbness and headaches  Psychiatric/Behavioral: Positive for sleep disturbance   Negative for decreased concentration, dysphoric mood, self-injury and suicidal ideas  The patient is not nervous/anxious  Objective:      /70   Pulse 74   Ht 5' 11" (1 803 m)   Wt 84 5 kg (186 lb 3 2 oz)   SpO2 98%   BMI 25 97 kg/m²          Physical Exam   Constitutional: He is oriented to person, place, and time  He appears well-developed and well-nourished  No distress  HENT:   Head: Normocephalic and atraumatic  Right Ear: External ear normal    Left Ear: External ear normal    Nose: Nose normal    Mouth/Throat: Oropharynx is clear and moist  No oropharyngeal exudate  Congestion noted in middle ears  Eyes: Conjunctivae and EOM are normal  Right eye exhibits no discharge  Left eye exhibits no discharge  No scleral icterus  Neck: Neck supple  Cardiovascular: Normal rate, regular rhythm, normal heart sounds and intact distal pulses  Pulmonary/Chest: Effort normal and breath sounds normal  No stridor  No respiratory distress  He has no wheezes  He has no rales  Abdominal: Soft  Bowel sounds are normal  He exhibits no distension and no mass  There is no tenderness  There is no rebound and no guarding  No hernia  Genitourinary: Rectum normal, prostate normal and penis normal  Rectal exam shows guaiac negative stool  No penile tenderness  Musculoskeletal: Normal range of motion  He exhibits no edema, tenderness or deformity  Neurological: He is alert and oriented to person, place, and time  No cranial nerve deficit  Coordination normal    Skin: Skin is warm and dry  He is not diaphoretic  Psychiatric: He has a normal mood and affect  His behavior is normal  Judgment and thought content normal    Nursing note and vitals reviewed  BMI Counseling: Body mass index is 25 97 kg/m²  The BMI is above normal  Nutrition recommendations include 3-5 servings of fruits/vegetables daily

## 2019-11-07 LAB — HCV AB SER-ACNC: 0.2

## 2019-11-08 LAB
ALBUMIN SERPL-MCNC: 4.4 G/DL (ref 3.6–4.8)
ALBUMIN/GLOB SERPL: 1.7 {RATIO} (ref 1.2–2.2)
ALP SERPL-CCNC: 95 IU/L (ref 39–117)
ALT SERPL-CCNC: 11 IU/L (ref 0–44)
AST SERPL-CCNC: 12 IU/L (ref 0–40)
BASOPHILS # BLD AUTO: 0 X10E3/UL (ref 0–0.2)
BASOPHILS NFR BLD AUTO: 1 %
BILIRUB SERPL-MCNC: 0.4 MG/DL (ref 0–1.2)
BUN SERPL-MCNC: 17 MG/DL (ref 8–27)
BUN/CREAT SERPL: 20 (ref 10–24)
CALCIUM SERPL-MCNC: 9.9 MG/DL (ref 8.6–10.2)
CHLORIDE SERPL-SCNC: 100 MMOL/L (ref 96–106)
CHOLEST SERPL-MCNC: 173 MG/DL (ref 100–199)
CHOLEST/HDLC SERPL: 3.1 RATIO (ref 0–5)
CO2 SERPL-SCNC: 23 MMOL/L (ref 20–29)
CREAT SERPL-MCNC: 0.83 MG/DL (ref 0.76–1.27)
EOSINOPHIL # BLD AUTO: 0.1 X10E3/UL (ref 0–0.4)
EOSINOPHIL NFR BLD AUTO: 2 %
ERYTHROCYTE [DISTWIDTH] IN BLOOD BY AUTOMATED COUNT: 13 % (ref 12.3–15.4)
GLOBULIN SER-MCNC: 2.6 G/DL (ref 1.5–4.5)
GLUCOSE SERPL-MCNC: 111 MG/DL (ref 65–99)
HCT VFR BLD AUTO: 42.6 % (ref 37.5–51)
HCV AB S/CO SERPL IA: 0.2 S/CO RATIO (ref 0–0.9)
HDLC SERPL-MCNC: 55 MG/DL
HGB BLD-MCNC: 14.7 G/DL (ref 13–17.7)
IMM GRANULOCYTES # BLD: 0 X10E3/UL (ref 0–0.1)
IMM GRANULOCYTES NFR BLD: 1 %
LDLC SERPL CALC-MCNC: 104 MG/DL (ref 0–99)
LYMPHOCYTES # BLD AUTO: 1.5 X10E3/UL (ref 0.7–3.1)
LYMPHOCYTES NFR BLD AUTO: 22 %
MCH RBC QN AUTO: 33.7 PG (ref 26.6–33)
MCHC RBC AUTO-ENTMCNC: 34.5 G/DL (ref 31.5–35.7)
MCV RBC AUTO: 98 FL (ref 79–97)
MONOCYTES # BLD AUTO: 0.8 X10E3/UL (ref 0.1–0.9)
MONOCYTES NFR BLD AUTO: 12 %
NEUTROPHILS # BLD AUTO: 4.2 X10E3/UL (ref 1.4–7)
NEUTROPHILS NFR BLD AUTO: 62 %
PLATELET # BLD AUTO: 232 X10E3/UL (ref 150–450)
POTASSIUM SERPL-SCNC: 4.9 MMOL/L (ref 3.5–5.2)
PROT SERPL-MCNC: 7 G/DL (ref 6–8.5)
PSA SERPL-MCNC: 2.2 NG/ML (ref 0–4)
RBC # BLD AUTO: 4.36 X10E6/UL (ref 4.14–5.8)
SL AMB EGFR AFRICAN AMERICAN: 110 ML/MIN/1.73
SL AMB EGFR NON AFRICAN AMERICAN: 95 ML/MIN/1.73
SL AMB REFLEX CRITERIA: NORMAL
SL AMB VLDL CHOLESTEROL CALC: 14 MG/DL (ref 5–40)
SODIUM SERPL-SCNC: 137 MMOL/L (ref 134–144)
TRIGL SERPL-MCNC: 68 MG/DL (ref 0–149)
TSH SERPL DL<=0.005 MIU/L-ACNC: 0.95 UIU/ML (ref 0.45–4.5)
WBC # BLD AUTO: 6.5 X10E3/UL (ref 3.4–10.8)

## 2019-11-15 ENCOUNTER — TELEPHONE (OUTPATIENT)
Dept: FAMILY MEDICINE CLINIC | Facility: HOSPITAL | Age: 59
End: 2019-11-15

## 2019-11-15 DIAGNOSIS — Z12.11 COLON CANCER SCREENING: Primary | ICD-10-CM

## 2019-11-15 DIAGNOSIS — F10.11 HISTORY OF ALCOHOL ABUSE: ICD-10-CM

## 2019-11-15 RX ORDER — MELATONIN
2000 DAILY
Qty: 60 TABLET | Refills: 3 | Status: SHIPPED | OUTPATIENT
Start: 2019-11-15 | End: 2020-01-13

## 2019-11-15 RX ORDER — FOLIC ACID 1 MG/1
1 TABLET ORAL DAILY
Qty: 30 TABLET | Refills: 3 | Status: SHIPPED | OUTPATIENT
Start: 2019-11-15 | End: 2020-01-13 | Stop reason: SDUPTHER

## 2019-11-15 RX ORDER — VITAMIN B COMPLEX
1 CAPSULE ORAL DAILY
Qty: 30 CAPSULE | Refills: 3 | Status: SHIPPED | OUTPATIENT
Start: 2019-11-15 | End: 2020-01-13 | Stop reason: SDUPTHER

## 2019-11-16 ENCOUNTER — TELEPHONE (OUTPATIENT)
Dept: OTHER | Facility: OTHER | Age: 59
End: 2019-11-16

## 2019-11-18 ENCOUNTER — TELEPHONE (OUTPATIENT)
Dept: FAMILY MEDICINE CLINIC | Facility: HOSPITAL | Age: 59
End: 2019-11-18

## 2020-01-13 ENCOUNTER — OFFICE VISIT (OUTPATIENT)
Dept: FAMILY MEDICINE CLINIC | Facility: HOSPITAL | Age: 60
End: 2020-01-13
Payer: COMMERCIAL

## 2020-01-13 VITALS
DIASTOLIC BLOOD PRESSURE: 90 MMHG | OXYGEN SATURATION: 99 % | SYSTOLIC BLOOD PRESSURE: 140 MMHG | BODY MASS INDEX: 26.26 KG/M2 | WEIGHT: 187.6 LBS | HEART RATE: 91 BPM | HEIGHT: 71 IN

## 2020-01-13 DIAGNOSIS — I10 ESSENTIAL HYPERTENSION: ICD-10-CM

## 2020-01-13 DIAGNOSIS — M25.50 PAIN IN JOINT, MULTIPLE SITES: Primary | ICD-10-CM

## 2020-01-13 DIAGNOSIS — F10.11 HISTORY OF ALCOHOL ABUSE: ICD-10-CM

## 2020-01-13 PROCEDURE — 4004F PT TOBACCO SCREEN RCVD TLK: CPT | Performed by: PHYSICIAN ASSISTANT

## 2020-01-13 PROCEDURE — 99213 OFFICE O/P EST LOW 20 MIN: CPT | Performed by: PHYSICIAN ASSISTANT

## 2020-01-13 PROCEDURE — 3008F BODY MASS INDEX DOCD: CPT | Performed by: PHYSICIAN ASSISTANT

## 2020-01-13 RX ORDER — VITAMIN B COMPLEX
1 CAPSULE ORAL DAILY
Qty: 30 CAPSULE | Refills: 3 | Status: SHIPPED | OUTPATIENT
Start: 2020-01-13 | End: 2020-05-20 | Stop reason: SDUPTHER

## 2020-01-13 RX ORDER — MELATONIN
4000 DAILY
Qty: 120 TABLET | Refills: 3 | Status: SHIPPED | OUTPATIENT
Start: 2020-01-13 | End: 2020-04-23

## 2020-01-13 RX ORDER — IBUPROFEN 200 MG
400 TABLET ORAL EVERY 6 HOURS PRN
Qty: 60 TABLET | Refills: 3 | Status: SHIPPED | OUTPATIENT
Start: 2020-01-13

## 2020-01-13 RX ORDER — LISINOPRIL 20 MG/1
20 TABLET ORAL DAILY
Qty: 90 TABLET | Refills: 3 | Status: SHIPPED | OUTPATIENT
Start: 2020-01-13 | End: 2020-05-20 | Stop reason: SDUPTHER

## 2020-01-13 RX ORDER — FOLIC ACID 1 MG/1
1 TABLET ORAL DAILY
Qty: 30 TABLET | Refills: 3 | Status: SHIPPED | OUTPATIENT
Start: 2020-01-13 | End: 2020-05-20 | Stop reason: SDUPTHER

## 2020-01-13 NOTE — PROGRESS NOTES
Assessment/Plan:         Diagnoses and all orders for this visit:    Pain in joint, multiple sites  -     ibuprofen (MOTRIN) 200 mg tablet; Take 2 tablets (400 mg total) by mouth every 6 (six) hours as needed for mild pain    History of alcohol abuse  -     cholecalciferol (VITAMIN D3) 1,000 units tablet; Take 4 tablets (4,000 Units total) by mouth daily  -     b complex vitamins capsule; Take 1 capsule by mouth daily  -     folic acid (FOLVITE) 1 mg tablet; Take 1 tablet (1 mg total) by mouth daily    Essential hypertension  -     lisinopril (ZESTRIL) 20 mg tablet; Take 1 tablet (20 mg total) by mouth daily        Subjective:      Patient ID: Gabby Astorga is a 62 y o  male  62year old white male presents for follow up  Still has aches and pains, but improved with vitamin D  Occasionally monitors blood pressure, about twice a week[de-identified]  105/70  Patient working full time, losing [employer] insurance  Working in [employer], at [place]; almost full time, needs refills on all meds  , and supplements, insurance covers them  Denies alcohol use, does smoke, and denies drug use  Patient goes to outpatient program with [facility]  Review of Systems   Constitutional: Negative for chills, diaphoresis, fatigue and fever  Respiratory: Negative for cough and shortness of breath  Gastrointestinal: Negative for abdominal pain, blood in stool, constipation, diarrhea, nausea and vomiting  Musculoskeletal: Positive for arthralgias, back pain and myalgias  Negative for neck pain and neck stiffness  Neurological: Negative for numbness  Objective:      /90   Pulse 91   Ht 5' 11" (1 803 m)   Wt 85 1 kg (187 lb 9 6 oz)   SpO2 99%   BMI 26 16 kg/m²          Physical Exam   Constitutional: He is oriented to person, place, and time  He appears well-developed and well-nourished  No distress  HENT:   Head: Normocephalic and atraumatic     Cardiovascular: Normal rate, regular rhythm and normal heart sounds  Pulmonary/Chest: Effort normal and breath sounds normal  No stridor  No respiratory distress  He has no wheezes  He has no rales  Musculoskeletal: Normal range of motion  He exhibits no edema, tenderness or deformity  Neurological: He is alert and oriented to person, place, and time  No cranial nerve deficit  Coordination normal    Skin: He is not diaphoretic  Psychiatric: He has a normal mood and affect   His behavior is normal  Judgment and thought content normal

## 2020-01-13 NOTE — PATIENT INSTRUCTIONS
Recommend monitoring blood pressure regularly, and urged to stop smoking  Refills for meds  And supplements sent to pharmacy

## 2020-04-23 DIAGNOSIS — F10.11 HISTORY OF ALCOHOL ABUSE: ICD-10-CM

## 2020-04-23 RX ORDER — AVOBENZONE, HOMOSALATE, OCTISALATE, OCTOCRYLENE 30; 100; 50; 25 MG/ML; MG/ML; MG/ML; MG/ML
SPRAY TOPICAL
Qty: 120 TABLET | Refills: 3 | Status: SHIPPED | OUTPATIENT
Start: 2020-04-23 | End: 2020-05-18

## 2020-05-18 DIAGNOSIS — F10.11 HISTORY OF ALCOHOL ABUSE: ICD-10-CM

## 2020-05-18 RX ORDER — AVOBENZONE, HOMOSALATE, OCTISALATE, OCTOCRYLENE 30; 100; 50; 25 MG/ML; MG/ML; MG/ML; MG/ML
SPRAY TOPICAL
Qty: 120 TABLET | Refills: 3 | Status: SHIPPED | OUTPATIENT
Start: 2020-05-18 | End: 2020-07-25 | Stop reason: SDUPTHER

## 2020-05-20 DIAGNOSIS — I10 ESSENTIAL HYPERTENSION: ICD-10-CM

## 2020-05-20 DIAGNOSIS — F10.11 HISTORY OF ALCOHOL ABUSE: ICD-10-CM

## 2020-05-20 RX ORDER — FOLIC ACID 1 MG/1
1 TABLET ORAL DAILY
Qty: 30 TABLET | Refills: 5 | Status: SHIPPED | OUTPATIENT
Start: 2020-05-20 | End: 2020-07-25 | Stop reason: SDUPTHER

## 2020-05-20 RX ORDER — VITAMIN B COMPLEX
1 CAPSULE ORAL DAILY
Qty: 30 CAPSULE | Refills: 5 | Status: SHIPPED | OUTPATIENT
Start: 2020-05-20 | End: 2020-07-25 | Stop reason: SDUPTHER

## 2020-05-20 RX ORDER — LISINOPRIL 20 MG/1
20 TABLET ORAL DAILY
Qty: 90 TABLET | Refills: 3 | Status: SHIPPED | OUTPATIENT
Start: 2020-05-20 | End: 2020-08-28 | Stop reason: SDUPTHER

## 2020-07-25 DIAGNOSIS — F10.11 HISTORY OF ALCOHOL ABUSE: ICD-10-CM

## 2020-07-27 RX ORDER — VITAMIN B COMPLEX
1 CAPSULE ORAL DAILY
Qty: 30 CAPSULE | Refills: 3 | Status: SHIPPED | OUTPATIENT
Start: 2020-07-27 | End: 2020-08-28 | Stop reason: SDUPTHER

## 2020-07-27 RX ORDER — FOLIC ACID 1 MG/1
1 TABLET ORAL DAILY
Qty: 30 TABLET | Refills: 3 | Status: SHIPPED | OUTPATIENT
Start: 2020-07-27 | End: 2020-08-28 | Stop reason: SDUPTHER

## 2020-08-05 ENCOUNTER — OFFICE VISIT (OUTPATIENT)
Dept: FAMILY MEDICINE CLINIC | Facility: HOSPITAL | Age: 60
End: 2020-08-05

## 2020-08-05 VITALS
DIASTOLIC BLOOD PRESSURE: 90 MMHG | BODY MASS INDEX: 28.03 KG/M2 | SYSTOLIC BLOOD PRESSURE: 160 MMHG | TEMPERATURE: 98.4 F | WEIGHT: 201 LBS

## 2020-08-05 DIAGNOSIS — I10 ESSENTIAL HYPERTENSION: Primary | ICD-10-CM

## 2020-08-05 PROCEDURE — 99212 OFFICE O/P EST SF 10 MIN: CPT | Performed by: PHYSICIAN ASSISTANT

## 2020-08-05 PROCEDURE — 3077F SYST BP >= 140 MM HG: CPT | Performed by: PHYSICIAN ASSISTANT

## 2020-08-05 PROCEDURE — 3080F DIAST BP >= 90 MM HG: CPT | Performed by: PHYSICIAN ASSISTANT

## 2020-08-05 PROCEDURE — 4004F PT TOBACCO SCREEN RCVD TLK: CPT | Performed by: PHYSICIAN ASSISTANT

## 2020-08-05 NOTE — PROGRESS NOTES
Assessment/Plan:    No problem-specific Assessment & Plan notes found for this encounter  Diagnoses and all orders for this visit:    Essential hypertension    Overweight  Subjective:      Patient ID: Karley Darnell is a 56 y o  male  56year old white male presents for follow up  Still working, 2-6 days a week, [employer], at [place], since November 2019 (Thanksgiving)-  Under 32 hours  Less active than usual    Only complaint is knee pains  Use to monitor blood pressure at [facility] aid, plans to get home blood pressure monitor  Energy good  Recently lost insurance, because of starting work again after being on disability  Review of Systems   Constitutional: Negative for appetite change, chills, diaphoresis, fatigue and fever  HENT: Negative for congestion, ear pain, rhinorrhea and sore throat  Respiratory: Negative for cough, chest tightness and shortness of breath  Gastrointestinal: Negative for abdominal pain, constipation, diarrhea, nausea and vomiting  Genitourinary: Negative for frequency  Musculoskeletal: Positive for arthralgias, back pain and myalgias  Negative for neck pain and neck stiffness  Occasional back pain  Objective:      /90 (BP Location: Left arm, Patient Position: Sitting, Cuff Size: Standard)   Temp 98 4 °F (36 9 °C)   Wt 91 2 kg (201 lb)   BMI 28 03 kg/m²          Physical Exam   Constitutional: He is oriented to person, place, and time  Non-toxic appearance  He does not appear ill  No distress  HENT:   Head: Normocephalic and atraumatic  Neck: Neck supple  Cardiovascular: Normal rate, regular rhythm, normal heart sounds and normal pulses  Exam reveals no gallop and no friction rub  No murmur heard  Pulmonary/Chest: Effort normal and breath sounds normal  No stridor  No respiratory distress  He has no wheezes  He has no rhonchi  He has no rales  He exhibits no tenderness  Abdominal: Normal appearance  Musculoskeletal: Normal range of motion  General: No swelling, tenderness, deformity or signs of injury  Right lower leg: No edema  Left lower leg: No edema  Neurological: He is alert and oriented to person, place, and time  He displays no weakness and normal reflexes  No cranial nerve deficit or sensory deficit  Coordination and gait normal    Skin: He is not diaphoretic  Psychiatric: His behavior is normal  Mood, judgment and thought content normal        BMI Counseling: Body mass index is 28 03 kg/m²  The BMI is above normal  Nutrition recommendations include 3-5 servings of fruits/vegetables daily

## 2020-08-05 NOTE — PATIENT INSTRUCTIONS
Obesity   AMBULATORY CARE:   Obesity  is when your body mass index (BMI) is greater than 30  Your healthcare provider will use your height and weight to measure your BMI  The risks of obesity include  many health problems, such as injuries or physical disability  You may need tests to check for the following:  · Diabetes     · High blood pressure or high cholesterol     · Heart disease     · Gallbladder or liver disease     · Cancer of the colon, breast, prostate, liver, or kidney     · Sleep apnea     · Arthritis or gout  Seek care immediately if:   · You have a severe headache, confusion, or difficulty speaking  · You have weakness on one side of your body  · You have chest pain, sweating, or shortness of breath  Contact your healthcare provider if:   · You have symptoms of gallbladder or liver disease, such as pain in your upper abdomen  · You have knee or hip pain and discomfort while walking  · You have symptoms of diabetes, such as intense hunger and thirst, and frequent urination  · You have symptoms of sleep apnea, such as snoring or daytime sleepiness  · You have questions or concerns about your condition or care  Treatment for obesity  focuses on helping you lose weight to improve your health  Even a small decrease in BMI can reduce the risk for many health problems  Your healthcare provider will help you set a weight-loss goal   · Lifestyle changes  are the first step in treating obesity  These include making healthy food choices and getting regular physical activity  Your healthcare provider may suggest a weight-loss program that involves coaching, education, and therapy  · Medicine  may help you lose weight when it is used with a healthy diet and physical activity  · Surgery  can help you lose weight if you are very obese and have other health problems  There are several types of weight-loss surgery  Ask your healthcare provider for more information    Be successful losing weight:   · Set small, realistic goals  An example of a small goal is to walk for 20 minutes 5 days a week  Anther goal is to lose 5% of your body weight  · Tell friends, family members, and coworkers about your goals  and ask for their support  Ask a friend to lose weight with you, or join a weight-loss support group  · Identify foods or triggers that may cause you to overeat , and find ways to avoid them  Remove tempting high-calorie foods from your home and workplace  Place a bowl of fresh fruit on your kitchen counter  If stress causes you to eat, then find other ways to cope with stress  · Keep a diary to track what you eat and drink  Also write down how many minutes of physical activity you do each day  Weigh yourself once a week and record it in your diary  Eating changes: You will need to eat 500 to 1,000 fewer calories each day than you currently eat to lose 1 to 2 pounds a week  The following changes will help you cut calories:  · Eat smaller portions  Use small plates, no larger than 9 inches in diameter  Fill your plate half full of fruits and vegetables  Measure your food using measuring cups until you know what a serving size looks like  · Eat 3 meals and 1 or 2 snacks each day  Plan your meals in advance  Yingflorence Cabrera and eat at home most of the time  Eat slowly  · Eat fruits and vegetables at every meal   They are low in calories and high in fiber, which makes you feel full  Do not add butter, margarine, or cream sauce to vegetables  Use herbs to season steamed vegetables  · Eat less fat and fewer fried foods  Eat more baked or grilled chicken and fish  These protein sources are lower in calories and fat than red meat  Limit fast food  Dress your salads with olive oil and vinegar instead of bottled dressing  · Limit the amount of sugar you eat  Do not drink sugary beverages  Limit alcohol  Activity changes:  Physical activity is good for your body in many ways   It helps you burn calories and build strong muscles  It decreases stress and depression, and improves your mood  It can also help you sleep better  Talk to your healthcare provider before you begin an exercise program   · Exercise for at least 30 minutes 5 days a week  Start slowly  Set aside time each day for physical activity that you enjoy and that is convenient for you  It is best to do both weight training and an activity that increases your heart rate, such as walking, bicycling, or swimming  · Find ways to be more active  Do yard work and housecleaning  Walk up the stairs instead of using elevators  Spend your leisure time going to events that require walking, such as outdoor festivals or fairs  This extra physical activity can help you lose weight and keep it off  Follow up with your healthcare provider as directed: You may need to meet with a dietitian  Write down your questions so you remember to ask them during your visits  © 2017 [employer] Information is for End User's use only and may not be sold, redistributed or otherwise used for commercial purposes  All illustrations and images included in CareNotes® are the copyrighted property of [employer]  or [employer] Lockett  The above information is an  only  It is not intended as medical advice for individual conditions or treatments  Talk to your doctor, nurse or pharmacist before following any medical regimen to see if it is safe and effective for you  Weight Management   AMBULATORY CARE:   Why it is important to manage your weight:  Being overweight increases your risk of health conditions such as heart disease, high blood pressure, type 2 diabetes, and certain types of cancer  It can also increase your risk for osteoarthritis, sleep apnea, and other respiratory problems  Aim for a slow, steady weight loss  Even a small amount of weight loss can lower your risk of health problems    How to lose weight safely:  A safe and healthy way to lose weight is to eat fewer calories and get regular exercise  You can lose up about 1 pound a week by decreasing the number of calories you eat by 500 calories each day  You can decrease calories by eating smaller portion sizes or by cutting out high-calorie foods  Read labels to find out how many calories are in the foods you eat  You can also burn calories with exercise such as walking, swimming, or biking  You will be more likely to keep weight off if you make these changes part of your lifestyle  Healthy meal plan for weight management:  A healthy meal plan includes a variety of foods, contains fewer calories, and helps you stay healthy  A healthy meal plan includes the following:  · Eat whole-grain foods more often  A healthy meal plan should contain fiber  Fiber is the part of grains, fruits, and vegetables that is not broken down by your body  Whole-grain foods are healthy and provide extra fiber in your diet  Some examples of whole-grain foods are whole-wheat breads and pastas, oatmeal, brown rice, and bulgur  · Eat a variety of vegetables every day  Include dark, leafy greens such as spinach, kale, vikki greens, and mustard greens  Eat yellow and orange vegetables such as carrots, sweet potatoes, and winter squash  · Eat a variety of fruits every day  Choose fresh or canned fruit (canned in its own juice or light syrup) instead of juice  Fruit juice has very little or no fiber  · Eat low-fat dairy foods  Drink fat-free (skim) milk or 1% milk  Eat fat-free yogurt and low-fat cottage cheese  Try low-fat cheeses such as mozzarella and other reduced-fat cheeses  · Choose meat and other protein foods that are low in fat  Choose beans or other legumes such as split peas or lentils  Choose fish, skinless poultry (chicken or turkey), or lean cuts of red meat (beef or pork)  Before you cook meat or poultry, cut off any visible fat  · Use less fat and oil  Try baking foods instead of frying them  Add less fat, such as margarine, sour cream, regular salad dressing and mayonnaise to foods  Eat fewer high-fat foods  Some examples of high-fat foods include french fries, doughnuts, ice cream, and cakes  · Eat fewer sweets  Limit foods and drinks that are high in sugar  This includes candy, cookies, regular soda, and sweetened drinks  Ways to decrease calories:   · Eat smaller portions  ¨ Use a small plate with smaller servings  ¨ Do not eat second helpings  ¨ When you eat at a restaurant, ask for a box and place half of your meal in the box before you eat  ¨ Share an entrée with someone else  · Replace high-calorie snacks with healthy, low-calorie snacks  ¨ Choose fresh fruit, vegetables, fat-free rice cakes, or air-popped popcorn instead of potato chips, nuts, or chocolate  ¨ Choose water or calorie-free drinks instead of soda or sweetened drinks  · Eat regular meals  Skipping meals can lead to overeating later in the day  Eat a healthy snack in place of a meal if you do not have time to eat a regular meal      · Do not shop for groceries when you are hungry  You may be more likely to make unhealthy food choices  Take a grocery list of healthy foods and shop after you have eaten  Exercise:  Exercise at least 30 minutes per day on most days of the week  Some examples of exercise include walking, biking, dancing, and swimming  You can also fit in more physical activity by taking the stairs instead of the elevator or parking farther away from stores  Ask your healthcare provider about the best exercise plan for you  Other things to consider as you try to lose weight:   · Be aware of situations that may give you the urge to overeat, such as eating while watching television  Find ways to avoid these situations  For example, read a book, go for a walk, or do crafts      · Meet with a weight loss support group or friends who are also trying to lose weight  This may help you stay motivated to continue working on your weight loss goals  © 2017 [employer]  Information is for End User's use only and may not be sold, redistributed or otherwise used for commercial purposes  All illustrations and images included in CareNotes® are the copyrighted property of [employer]  or Oliver Lockett  The above information is an  only  It is not intended as medical advice for individual conditions or treatments  Talk to your doctor, nurse or pharmacist before following any medical regimen to see if it is safe and effective for you  Encouraged patient to stop smoking  Monitor blood pressure

## 2020-08-28 DIAGNOSIS — I10 ESSENTIAL HYPERTENSION: ICD-10-CM

## 2020-08-28 DIAGNOSIS — F10.11 HISTORY OF ALCOHOL ABUSE: ICD-10-CM

## 2020-08-28 RX ORDER — AVOBENZONE, HOMOSALATE, OCTISALATE, OCTOCRYLENE 30; 100; 50; 25 MG/ML; MG/ML; MG/ML; MG/ML
4000 SPRAY TOPICAL DAILY
Qty: 120 TABLET | Refills: 5 | Status: SHIPPED | OUTPATIENT
Start: 2020-08-28 | End: 2020-09-23 | Stop reason: SDUPTHER

## 2020-08-28 RX ORDER — VITAMIN B COMPLEX
1 CAPSULE ORAL DAILY
Qty: 30 CAPSULE | Refills: 5 | Status: SHIPPED | OUTPATIENT
Start: 2020-08-28 | End: 2020-09-23 | Stop reason: SDUPTHER

## 2020-08-28 RX ORDER — LISINOPRIL 20 MG/1
20 TABLET ORAL DAILY
Qty: 90 TABLET | Refills: 3 | Status: SHIPPED | OUTPATIENT
Start: 2020-08-28 | End: 2020-11-22 | Stop reason: SDUPTHER

## 2020-08-28 RX ORDER — FOLIC ACID 1 MG/1
1 TABLET ORAL DAILY
Qty: 30 TABLET | Refills: 5 | Status: SHIPPED | OUTPATIENT
Start: 2020-08-28 | End: 2020-09-23 | Stop reason: SDUPTHER

## 2020-09-23 DIAGNOSIS — F10.11 HISTORY OF ALCOHOL ABUSE: ICD-10-CM

## 2020-09-25 DIAGNOSIS — F10.11 HISTORY OF ALCOHOL ABUSE: ICD-10-CM

## 2020-09-25 RX ORDER — FOLIC ACID 1 MG/1
1 TABLET ORAL DAILY
Qty: 30 TABLET | Refills: 5 | Status: SHIPPED | OUTPATIENT
Start: 2020-09-25 | End: 2020-11-03 | Stop reason: SDUPTHER

## 2020-09-25 RX ORDER — FOLIC ACID 1 MG/1
1 TABLET ORAL DAILY
Qty: 30 TABLET | Refills: 0 | Status: CANCELLED | OUTPATIENT
Start: 2020-09-25

## 2020-09-25 RX ORDER — VITAMIN B COMPLEX
1 CAPSULE ORAL DAILY
Qty: 30 CAPSULE | Refills: 5 | Status: SHIPPED | OUTPATIENT
Start: 2020-09-25 | End: 2020-11-03 | Stop reason: SDUPTHER

## 2020-09-25 RX ORDER — AVOBENZONE, HOMOSALATE, OCTISALATE, OCTOCRYLENE 30; 100; 50; 25 MG/ML; MG/ML; MG/ML; MG/ML
4000 SPRAY TOPICAL DAILY
Qty: 120 TABLET | Refills: 0 | Status: CANCELLED | OUTPATIENT
Start: 2020-09-25

## 2020-09-25 RX ORDER — VITAMIN B COMPLEX
1 CAPSULE ORAL DAILY
Qty: 30 CAPSULE | Refills: 0 | Status: CANCELLED | OUTPATIENT
Start: 2020-09-25

## 2020-09-25 RX ORDER — AVOBENZONE, HOMOSALATE, OCTISALATE, OCTOCRYLENE 30; 100; 50; 25 MG/ML; MG/ML; MG/ML; MG/ML
4000 SPRAY TOPICAL DAILY
Qty: 120 TABLET | Refills: 0 | Status: SHIPPED | OUTPATIENT
Start: 2020-09-25 | End: 2020-11-03 | Stop reason: SDUPTHER

## 2020-10-15 ENCOUNTER — APPOINTMENT (EMERGENCY)
Dept: RADIOLOGY | Facility: HOSPITAL | Age: 60
End: 2020-10-15

## 2020-10-15 ENCOUNTER — HOSPITAL ENCOUNTER (EMERGENCY)
Facility: HOSPITAL | Age: 60
Discharge: HOME | End: 2020-10-15
Attending: EMERGENCY MEDICINE | Admitting: EMERGENCY MEDICINE
Payer: COMMERCIAL

## 2020-10-15 VITALS
RESPIRATION RATE: 20 BRPM | HEIGHT: 71 IN | HEART RATE: 92 BPM | BODY MASS INDEX: 28 KG/M2 | WEIGHT: 200 LBS | OXYGEN SATURATION: 94 % | SYSTOLIC BLOOD PRESSURE: 115 MMHG | DIASTOLIC BLOOD PRESSURE: 68 MMHG | TEMPERATURE: 97.4 F

## 2020-10-15 DIAGNOSIS — E86.0 DEHYDRATION: Primary | ICD-10-CM

## 2020-10-15 DIAGNOSIS — J40 BRONCHITIS: ICD-10-CM

## 2020-10-15 DIAGNOSIS — R55 NEAR SYNCOPE: ICD-10-CM

## 2020-10-15 LAB
ANION GAP SERPL CALCULATED.3IONS-SCNC: 7 MMOL/L (ref 4–13)
BASOPHILS # BLD AUTO: 0.09 THOUSANDS/ΜL (ref 0–0.1)
BASOPHILS NFR BLD AUTO: 1 % (ref 0–1)
BUN SERPL-MCNC: 19 MG/DL (ref 5–25)
CALCIUM SERPL-MCNC: 9 MG/DL (ref 8.3–10.1)
CHLORIDE SERPL-SCNC: 101 MMOL/L (ref 100–108)
CO2 SERPL-SCNC: 28 MMOL/L (ref 21–32)
CREAT SERPL-MCNC: 0.79 MG/DL (ref 0.6–1.3)
EOSINOPHIL # BLD AUTO: 0.21 THOUSAND/ΜL (ref 0–0.61)
EOSINOPHIL NFR BLD AUTO: 3 % (ref 0–6)
ERYTHROCYTE [DISTWIDTH] IN BLOOD BY AUTOMATED COUNT: 12.2 % (ref 11.6–15.1)
GFR SERPL CREATININE-BSD FRML MDRD: 96 ML/MIN/1.73SQ M
GLUCOSE SERPL-MCNC: 133 MG/DL (ref 65–140)
GLUCOSE SERPL-MCNC: 139 MG/DL (ref 65–140)
HCT VFR BLD AUTO: 39.6 % (ref 36.5–49.3)
HGB BLD-MCNC: 13.5 G/DL (ref 12–17)
IMM GRANULOCYTES # BLD AUTO: 0.04 THOUSAND/UL (ref 0–0.2)
IMM GRANULOCYTES NFR BLD AUTO: 1 % (ref 0–2)
LYMPHOCYTES # BLD AUTO: 1.68 THOUSANDS/ΜL (ref 0.6–4.47)
LYMPHOCYTES NFR BLD AUTO: 27 % (ref 14–44)
MCH RBC QN AUTO: 35.9 PG (ref 26.8–34.3)
MCHC RBC AUTO-ENTMCNC: 34.1 G/DL (ref 31.4–37.4)
MCV RBC AUTO: 105 FL (ref 82–98)
MONOCYTES # BLD AUTO: 0.73 THOUSAND/ΜL (ref 0.17–1.22)
MONOCYTES NFR BLD AUTO: 12 % (ref 4–12)
NEUTROPHILS # BLD AUTO: 3.47 THOUSANDS/ΜL (ref 1.85–7.62)
NEUTS SEG NFR BLD AUTO: 56 % (ref 43–75)
NRBC BLD AUTO-RTO: 0 /100 WBCS
PLATELET # BLD AUTO: 198 THOUSANDS/UL (ref 149–390)
PMV BLD AUTO: 8.6 FL (ref 8.9–12.7)
POTASSIUM SERPL-SCNC: 4.3 MMOL/L (ref 3.5–5.3)
RBC # BLD AUTO: 3.76 MILLION/UL (ref 3.88–5.62)
SODIUM SERPL-SCNC: 136 MMOL/L (ref 136–145)
WBC # BLD AUTO: 6.22 THOUSAND/UL (ref 4.31–10.16)

## 2020-10-15 PROCEDURE — 80048 BASIC METABOLIC PNL TOTAL CA: CPT | Performed by: EMERGENCY MEDICINE

## 2020-10-15 PROCEDURE — 93005 ELECTROCARDIOGRAM TRACING: CPT

## 2020-10-15 PROCEDURE — 99285 EMERGENCY DEPT VISIT HI MDM: CPT | Performed by: EMERGENCY MEDICINE

## 2020-10-15 PROCEDURE — 96360 HYDRATION IV INFUSION INIT: CPT

## 2020-10-15 PROCEDURE — 85025 COMPLETE CBC W/AUTO DIFF WBC: CPT | Performed by: EMERGENCY MEDICINE

## 2020-10-15 PROCEDURE — 36415 COLL VENOUS BLD VENIPUNCTURE: CPT | Performed by: EMERGENCY MEDICINE

## 2020-10-15 PROCEDURE — 82948 REAGENT STRIP/BLOOD GLUCOSE: CPT

## 2020-10-15 PROCEDURE — 71045 X-RAY EXAM CHEST 1 VIEW: CPT

## 2020-10-15 PROCEDURE — 99284 EMERGENCY DEPT VISIT MOD MDM: CPT

## 2020-10-15 RX ORDER — ALBUTEROL SULFATE 90 UG/1
1-2 AEROSOL, METERED RESPIRATORY (INHALATION) EVERY 6 HOURS PRN
Qty: 1 INHALER | Refills: 0 | Status: SHIPPED | OUTPATIENT
Start: 2020-10-15 | End: 2022-04-08 | Stop reason: SDUPTHER

## 2020-10-15 RX ORDER — ALBUTEROL SULFATE 90 UG/1
2 AEROSOL, METERED RESPIRATORY (INHALATION) ONCE
Status: COMPLETED | OUTPATIENT
Start: 2020-10-15 | End: 2020-10-15

## 2020-10-15 RX ADMIN — ALBUTEROL SULFATE 2 PUFF: 90 AEROSOL, METERED RESPIRATORY (INHALATION) at 20:33

## 2020-10-15 RX ADMIN — SODIUM CHLORIDE 1000 ML: 0.9 INJECTION, SOLUTION INTRAVENOUS at 20:29

## 2020-10-16 LAB
ATRIAL RATE: 95 BPM
P AXIS: 76 DEGREES
PR INTERVAL: 176 MS
QRS AXIS: 78 DEGREES
QRSD INTERVAL: 100 MS
QT INTERVAL: 350 MS
QTC INTERVAL: 439 MS
T WAVE AXIS: 82 DEGREES
VENTRICULAR RATE: 95 BPM

## 2020-10-16 PROCEDURE — 93010 ELECTROCARDIOGRAM REPORT: CPT | Performed by: INTERNAL MEDICINE

## 2020-10-20 ENCOUNTER — VBI (OUTPATIENT)
Dept: FAMILY MEDICINE CLINIC | Facility: HOSPITAL | Age: 60
End: 2020-10-20

## 2020-11-03 DIAGNOSIS — F10.11 HISTORY OF ALCOHOL ABUSE: ICD-10-CM

## 2020-11-04 RX ORDER — VITAMIN B COMPLEX
1 CAPSULE ORAL DAILY
Qty: 30 CAPSULE | Refills: 3 | Status: SHIPPED | OUTPATIENT
Start: 2020-11-04 | End: 2021-01-06 | Stop reason: SDUPTHER

## 2020-11-04 RX ORDER — FOLIC ACID 1 MG/1
1 TABLET ORAL DAILY
Qty: 30 TABLET | Refills: 3 | Status: SHIPPED | OUTPATIENT
Start: 2020-11-04 | End: 2021-01-06 | Stop reason: SDUPTHER

## 2020-11-04 RX ORDER — AVOBENZONE, HOMOSALATE, OCTISALATE, OCTOCRYLENE 30; 100; 50; 25 MG/ML; MG/ML; MG/ML; MG/ML
4000 SPRAY TOPICAL DAILY
Qty: 120 TABLET | Refills: 3 | Status: SHIPPED | OUTPATIENT
Start: 2020-11-04 | End: 2021-01-06 | Stop reason: SDUPTHER

## 2020-11-13 ENCOUNTER — OFFICE VISIT (OUTPATIENT)
Dept: FAMILY MEDICINE CLINIC | Facility: HOSPITAL | Age: 60
End: 2020-11-13

## 2020-11-13 VITALS
SYSTOLIC BLOOD PRESSURE: 120 MMHG | WEIGHT: 206 LBS | TEMPERATURE: 97.8 F | DIASTOLIC BLOOD PRESSURE: 70 MMHG | BODY MASS INDEX: 28.73 KG/M2

## 2020-11-13 DIAGNOSIS — J32.9 SINUSITIS, UNSPECIFIED CHRONICITY, UNSPECIFIED LOCATION: Primary | ICD-10-CM

## 2020-11-13 PROCEDURE — 99213 OFFICE O/P EST LOW 20 MIN: CPT | Performed by: PHYSICIAN ASSISTANT

## 2020-11-13 RX ORDER — CEFUROXIME AXETIL 250 MG/1
250 TABLET ORAL EVERY 12 HOURS SCHEDULED
Qty: 20 TABLET | Refills: 0 | Status: SHIPPED | OUTPATIENT
Start: 2020-11-13 | End: 2020-11-23

## 2020-11-22 DIAGNOSIS — I10 ESSENTIAL HYPERTENSION: ICD-10-CM

## 2020-11-23 RX ORDER — LISINOPRIL 20 MG/1
20 TABLET ORAL DAILY
Qty: 90 TABLET | Refills: 1 | Status: SHIPPED | OUTPATIENT
Start: 2020-11-23 | End: 2021-02-23 | Stop reason: SDUPTHER

## 2021-01-06 DIAGNOSIS — F10.11 HISTORY OF ALCOHOL ABUSE: ICD-10-CM

## 2021-01-06 RX ORDER — VITAMIN B COMPLEX
1 CAPSULE ORAL DAILY
Qty: 30 CAPSULE | Refills: 5 | Status: SHIPPED | OUTPATIENT
Start: 2021-01-06 | End: 2021-03-03 | Stop reason: SDUPTHER

## 2021-01-06 RX ORDER — AVOBENZONE, HOMOSALATE, OCTISALATE, OCTOCRYLENE 30; 100; 50; 25 MG/ML; MG/ML; MG/ML; MG/ML
4000 SPRAY TOPICAL DAILY
Qty: 120 TABLET | Refills: 5 | Status: SHIPPED | OUTPATIENT
Start: 2021-01-06 | End: 2021-03-03 | Stop reason: SDUPTHER

## 2021-01-06 RX ORDER — FOLIC ACID 1 MG/1
1 TABLET ORAL DAILY
Qty: 30 TABLET | Refills: 5 | Status: SHIPPED | OUTPATIENT
Start: 2021-01-06 | End: 2021-03-03 | Stop reason: SDUPTHER

## 2021-02-04 ENCOUNTER — OFFICE VISIT (OUTPATIENT)
Dept: FAMILY MEDICINE CLINIC | Facility: HOSPITAL | Age: 61
End: 2021-02-04

## 2021-02-04 VITALS
DIASTOLIC BLOOD PRESSURE: 90 MMHG | OXYGEN SATURATION: 96 % | WEIGHT: 206.9 LBS | SYSTOLIC BLOOD PRESSURE: 140 MMHG | HEIGHT: 71 IN | HEART RATE: 105 BPM | BODY MASS INDEX: 28.97 KG/M2

## 2021-02-04 DIAGNOSIS — Z87.828 HISTORY OF JOINT INJURY: ICD-10-CM

## 2021-02-04 DIAGNOSIS — G89.29 CHRONIC PAIN OF LEFT KNEE: Primary | ICD-10-CM

## 2021-02-04 DIAGNOSIS — M25.562 CHRONIC PAIN OF LEFT KNEE: Primary | ICD-10-CM

## 2021-02-04 PROCEDURE — 99212 OFFICE O/P EST SF 10 MIN: CPT | Performed by: PHYSICIAN ASSISTANT

## 2021-02-04 NOTE — PATIENT INSTRUCTIONS
Recommend taking vitamin D 4000 IU, vitamin B complex and folic acid  Also recommend claritin  pathology was noted.    Reason for block: post-op pain management

## 2021-02-04 NOTE — PROGRESS NOTES
Assessment/Plan:           Problem List Items Addressed This Visit     None      Visit Diagnoses     Chronic pain of left knee    -  Primary    Relevant Orders    XR knee 4+ vw left injury    Ambulatory referral to Orthopedic Surgery    History of joint injury        Relevant Orders    XR knee 4+ vw left injury    Ambulatory referral to Orthopedic Surgery          HTN- not well controlled, recommend monitoring blood  Pressure, and calling office in 1-2 weeks with readings  Subjective:      Patient ID: Adrianna Salas is a 56 y o  male  Presents for follow up  C/o increasing pain affecting left knee, has been using brace  Had an injury affecting left knee, about 30 years ago  Was working at [employer], has not worked since Tuesday, after Christmas, 12/29/2020  Has not been able  To return to work, trying to fight trying to get back to working  Review of Systems   Constitutional: Negative for chills, diaphoresis, fatigue and fever  HENT: Positive for congestion, postnasal drip and rhinorrhea  Negative for ear pain and sore throat  Respiratory: Positive for cough  Negative for chest tightness and shortness of breath  Gastrointestinal: Positive for diarrhea  Negative for abdominal pain, constipation, nausea and vomiting  Occasionally diarrhea  Musculoskeletal: Positive for arthralgias, back pain, myalgias, neck pain and neck stiffness  Objective:      /90   Pulse 105   Ht 5' 11" (1 803 m)   Wt 93 8 kg (206 lb 14 4 oz)   SpO2 96%   BMI 28 86 kg/m²          Physical Exam  Vitals signs and nursing note reviewed  Constitutional:       General: He is not in acute distress  Appearance: Normal appearance  He is not ill-appearing, toxic-appearing or diaphoretic  HENT:      Head: Normocephalic and atraumatic  Cardiovascular:      Rate and Rhythm: Normal rate and regular rhythm  Pulses: Normal pulses        Heart sounds: Normal heart sounds  Pulmonary:      Effort: Pulmonary effort is normal  No respiratory distress  Breath sounds: Normal breath sounds  No stridor  No wheezing or rhonchi  Musculoskeletal: Normal range of motion  General: No deformity or signs of injury  Right lower leg: No edema  Left lower leg: No edema  Comments: Has brace on left knee  Neurological:      General: No focal deficit present  Mental Status: He is alert and oriented to person, place, and time  Psychiatric:         Mood and Affect: Mood normal          Behavior: Behavior normal          Thought Content:  Thought content normal          Judgment: Judgment normal

## 2021-02-08 ENCOUNTER — OFFICE VISIT (OUTPATIENT)
Dept: OBGYN CLINIC | Facility: CLINIC | Age: 61
End: 2021-02-08

## 2021-02-08 ENCOUNTER — APPOINTMENT (OUTPATIENT)
Dept: RADIOLOGY | Facility: CLINIC | Age: 61
End: 2021-02-08

## 2021-02-08 VITALS — HEIGHT: 71 IN | BODY MASS INDEX: 28.56 KG/M2 | TEMPERATURE: 95.9 F | WEIGHT: 204 LBS

## 2021-02-08 DIAGNOSIS — M25.562 LEFT KNEE PAIN, UNSPECIFIED CHRONICITY: ICD-10-CM

## 2021-02-08 DIAGNOSIS — M25.562 CHRONIC PAIN OF LEFT KNEE: ICD-10-CM

## 2021-02-08 DIAGNOSIS — M17.12 ARTHRITIS OF LEFT KNEE: Primary | ICD-10-CM

## 2021-02-08 DIAGNOSIS — Z87.828 HISTORY OF JOINT INJURY: ICD-10-CM

## 2021-02-08 DIAGNOSIS — G89.29 CHRONIC PAIN OF LEFT KNEE: ICD-10-CM

## 2021-02-08 PROCEDURE — 77073 BONE LENGTH STUDIES: CPT

## 2021-02-08 PROCEDURE — 99243 OFF/OP CNSLTJ NEW/EST LOW 30: CPT | Performed by: ORTHOPAEDIC SURGERY

## 2021-02-08 PROCEDURE — 73564 X-RAY EXAM KNEE 4 OR MORE: CPT

## 2021-02-08 NOTE — PROGRESS NOTES
Orthopaedic Surgery Note    CC: Left Knee Pain      HPI:  Ge Selby is a 56 y o male with a history of left knee pain  He reports that he sustained an injury back in May 1990 and reprots he was told he had an ACL injury but that he did not have surgery  He has had troubles with the left knee since that time  He reports periodic swelling of the knee  The pain is also described as instability, grinding  The pain is rated as moderate and ranges numerically between 5-10 of 10  He does state that he takes NSAIDs and this provides some relief  He has not tried tylenol  He does some exercises at home but has not tried formal PT  He has not had any injections and no previous surgery  ALLERGIES:  Allergies   Allergen Reactions    Other      Thiomersal (contact solution) - pt reaction is eye redness         CURRENT MEDICATIONS:  Current Outpatient Medications   Medication Sig Dispense Refill    albuterol (PROVENTIL HFA,VENTOLIN HFA) 90 mcg/act inhaler Inhale 1-2 puffs every 6 (six) hours as needed for wheezing or shortness of breath 1 Inhaler 0    b complex vitamins capsule Take 1 capsule by mouth daily 30 capsule 5    Cholecalciferol (RA Vitamin D-3) 25 MCG (1000 UT) tablet Take 4 tablets (4,000 Units total) by mouth daily 799 tablet 5    folic acid (FOLVITE) 1 mg tablet Take 1 tablet (1 mg total) by mouth daily 30 tablet 5    ibuprofen (MOTRIN) 200 mg tablet Take 2 tablets (400 mg total) by mouth every 6 (six) hours as needed for mild pain 60 tablet 3    lisinopril (ZESTRIL) 20 mg tablet Take 1 tablet (20 mg total) by mouth daily 90 tablet 1    fluocinonide-emollient (LIDEX-E) 0 05 % cream Apply topically 2 (two) times a day (Patient not taking: Reported on 1/13/2020) 30 g 3     No current facility-administered medications for this visit          PAST MEDICAL HISTORY  Past Medical History:   Diagnosis Date    Hypertension     Osteoarthritis     bilateral knees       SURGICAL HISTORY  History reviewed  No pertinent surgical history  FAMILY HISTORY  History reviewed  No pertinent family history  SOCIAL HISTORY  Social History     Socioeconomic History    Marital status:      Spouse name: Not on file    Number of children: Not on file    Years of education: Not on file    Highest education level: 12th grade   Occupational History    Occupation: glass tech   Social Needs    Financial resource strain: Somewhat hard    Food insecurity     Worry: Never true     Inability: Never true    Transportation needs     Medical: No     Non-medical: Not on file   Tobacco Use    Smoking status: Current Every Day Smoker     Packs/day: 1 00    Smokeless tobacco: Never Used   Substance and Sexual Activity    Alcohol use: Not Currently     Frequency: 4 or more times a week     Drinks per session: 1 or 2     Binge frequency: Never     Comment: Pateint is a recovering alcoholic   Drug use: No    Sexual activity: Not Currently   Lifestyle    Physical activity     Days per week: 5 days     Minutes per session: 150+ min    Stress: Not at all   Relationships    Social connections     Talks on phone: More than three times a week     Gets together: Never     Attends [employer] service: Never     Active member of club or organization: Yes     Attends meetings of clubs or organizations: More than 4 times per year     Relationship status:     Intimate partner violence     Fear of current or ex partner: No     Emotionally abused: No     Physically abused: No     Forced sexual activity: No   Other Topics Concern    Not on file   Social History Narrative    Wears seatbelt        Exercise habits     Lives with family          Review of Systems       Patient indicated positive for glasses and left knee pain  Otherwise negative except per above and HPI      Physical Exam    Vitals  Vitals:    02/08/21 1028   Temp: (!) 95 9 °F (35 5 °C)       BMI  Body mass index is 28 45 kg/m²     GENERAL: No acute distress  Alert and oriented  Well nourished and well hydrated  Appears stated age  HEENT : Normocephalic, atraumatic  Extraocular movements intact  Mask in place  NECK: Supple, trachea midline  LUNGS: Adequate and symmetric respiratory effort  No intercostal retractions or accessory muscle use  HEART: Extremities warm and perfused  ABDOMEN: Nondistended  SKIN: Warm and dry, no rash  Left  Knee   Inspection/Appearance:       Swelling: No      Patella is midline  Alignment:  Knee is in mild varus  Palpation - Soft Tissue: normal without effusion  ROM:       Extension - 0          Flexion - 120      extensor lag: no    Stability:  demonstrates no varus, valgus, anterior drawer or posterior drawer  Patella: stable, tracks normally  Skin over lower leg with scaling - reports that this occurred related to blood pressure medicine a few years ago  No open lesions  Sensation Intact to Light Touch in Sural, Saphenous, Tibial, Superficial Peroneal, and Deep Peroneal Nerve Distribution  Motor function 5 out of 5 strength in Tibialis Anterior, Gastrocnemius, Soleus, Extensor Hallucis Longus, and Flexor Hallucis Longus Muscles  Extremity Warm and Well Perfused  Imaging  A) Imaging modality available  Radiographs: yes  MRI scan: no  CT scan: no    B) Imaging findings  Subchondral cysts: yes  Subchondral sclerosis: yes  Periarticular osteophytes: yes  Joint subluxation: no  Joint space narrowing: yes  Bone-on-bone articulations: yes  Avascular necrosis: no      Assessment and Plan  Left  Knee Arthritis    - reviewed nonop mgmt of OA as below  TKA would be definitive treatment but he is not interested at this time  Recommend PT, PO meds, CSI  He states his pain is not severe today and he would like to hold off on CSI      Knee Pain / Arthritis Treatment Recommendations    Strengthening Exercises  10 repetitions each leg Monday, Wednesday, Friday OR Tuesday, Thursday, Saturday  Increase 10 repetitions per week  1  Straight leg raises (SLR)  2  VMO Modification SLR - (Rotate hip out externally) Do if SLR becomes easy    Exercise - 5 minutes per day Monday, Wednesday, Friday OR Tuesday, Thursday, Saturday  Increase 5 minutes per day per week  May use bike (non-impact) or walk (impact) or swim or alternate  Goal is to get up to at least 30 minutes per day  1  Bicycle  2  Walking    Weight loss   Goal to lose 1 pound per week  Portion control, limit sweets, limit carbs    Medications  Anti-inflammatories (NSAIDs)  1  Ibuprofen (Motrin or Advil) 600 mg (3 pills) with food 3 times a day for 3 - 7 days  OR  2  Naprosyn (Aleve) 1 - 2 pills twice a day with food for 3 - 7 days  Stop if you develop upset stomach or bleeding occurs  We discussed that scheduled NSAIDs for greater than 1 week should be discussed with PCP to monitor for potential side effects  Pain reliever  1  Acetaminophen (Tylenol) 2 pills (regular or extra-strength) 3 times a day for 3 - 7 days    Taken together (Acetaminophen with one of the NSAIDs (ibuprofen OR naprosyn)), the combination may work better than either one alone for more acute pain    Other  Braces, wraps, ice, heat, topical ointments may all be used/tried if they help pain/symptoms                Matt Adams MD  Adult Reconstruction Surgery  Department [address]  11:14 AM

## 2021-02-23 DIAGNOSIS — I10 ESSENTIAL HYPERTENSION: ICD-10-CM

## 2021-02-24 RX ORDER — LISINOPRIL 20 MG/1
20 TABLET ORAL DAILY
Qty: 90 TABLET | Refills: 3 | Status: SHIPPED | OUTPATIENT
Start: 2021-02-24 | End: 2021-05-28 | Stop reason: SDUPTHER

## 2021-03-03 DIAGNOSIS — F10.11 HISTORY OF ALCOHOL ABUSE: ICD-10-CM

## 2021-03-04 RX ORDER — AVOBENZONE, HOMOSALATE, OCTISALATE, OCTOCRYLENE 30; 100; 50; 25 MG/ML; MG/ML; MG/ML; MG/ML
4000 SPRAY TOPICAL DAILY
Qty: 120 TABLET | Refills: 5 | Status: SHIPPED | OUTPATIENT
Start: 2021-03-04 | End: 2021-04-10 | Stop reason: SDUPTHER

## 2021-03-04 RX ORDER — FOLIC ACID 1 MG/1
1 TABLET ORAL DAILY
Qty: 30 TABLET | Refills: 5 | Status: SHIPPED | OUTPATIENT
Start: 2021-03-04 | End: 2021-04-10 | Stop reason: SDUPTHER

## 2021-03-04 RX ORDER — VITAMIN B COMPLEX
1 CAPSULE ORAL DAILY
Qty: 30 CAPSULE | Refills: 5 | Status: SHIPPED | OUTPATIENT
Start: 2021-03-04 | End: 2021-04-10 | Stop reason: SDUPTHER

## 2021-03-10 DIAGNOSIS — Z23 ENCOUNTER FOR IMMUNIZATION: ICD-10-CM

## 2021-04-10 DIAGNOSIS — F10.11 HISTORY OF ALCOHOL ABUSE: ICD-10-CM

## 2021-04-12 RX ORDER — VITAMIN B COMPLEX
1 CAPSULE ORAL DAILY
Qty: 30 CAPSULE | Refills: 5 | Status: SHIPPED | OUTPATIENT
Start: 2021-04-12 | End: 2021-05-28 | Stop reason: SDUPTHER

## 2021-04-12 RX ORDER — AVOBENZONE, HOMOSALATE, OCTISALATE, OCTOCRYLENE 30; 100; 50; 25 MG/ML; MG/ML; MG/ML; MG/ML
4000 SPRAY TOPICAL DAILY
Qty: 120 TABLET | Refills: 5 | Status: SHIPPED | OUTPATIENT
Start: 2021-04-12 | End: 2021-05-28 | Stop reason: SDUPTHER

## 2021-04-12 RX ORDER — FOLIC ACID 1 MG/1
1 TABLET ORAL DAILY
Qty: 30 TABLET | Refills: 5 | Status: SHIPPED | OUTPATIENT
Start: 2021-04-12 | End: 2021-05-28 | Stop reason: SDUPTHER

## 2021-05-28 DIAGNOSIS — F10.11 HISTORY OF ALCOHOL ABUSE: ICD-10-CM

## 2021-05-28 DIAGNOSIS — I10 ESSENTIAL HYPERTENSION: ICD-10-CM

## 2021-06-02 RX ORDER — LISINOPRIL 20 MG/1
20 TABLET ORAL DAILY
Qty: 90 TABLET | Refills: 3 | Status: SHIPPED | OUTPATIENT
Start: 2021-06-02 | End: 2022-03-18 | Stop reason: SDUPTHER

## 2021-06-02 RX ORDER — AVOBENZONE, HOMOSALATE, OCTISALATE, OCTOCRYLENE 30; 100; 50; 25 MG/ML; MG/ML; MG/ML; MG/ML
4000 SPRAY TOPICAL DAILY
Qty: 120 TABLET | Refills: 3 | Status: SHIPPED | OUTPATIENT
Start: 2021-06-02 | End: 2021-08-05 | Stop reason: SDUPTHER

## 2021-06-02 RX ORDER — AVOBENZONE, HOMOSALATE, OCTISALATE, OCTOCRYLENE 30; 100; 50; 25 MG/ML; MG/ML; MG/ML; MG/ML
4000 SPRAY TOPICAL DAILY
Qty: 120 TABLET | Refills: 5 | Status: SHIPPED | OUTPATIENT
Start: 2021-06-02 | End: 2021-06-29 | Stop reason: SDUPTHER

## 2021-06-02 RX ORDER — FOLIC ACID 1 MG/1
1 TABLET ORAL DAILY
Qty: 30 TABLET | Refills: 5 | Status: SHIPPED | OUTPATIENT
Start: 2021-06-02 | End: 2021-06-29 | Stop reason: SDUPTHER

## 2021-06-02 RX ORDER — VITAMIN B COMPLEX
1 CAPSULE ORAL DAILY
Qty: 30 CAPSULE | Refills: 5 | Status: SHIPPED | OUTPATIENT
Start: 2021-06-02 | End: 2021-06-29 | Stop reason: SDUPTHER

## 2021-06-29 DIAGNOSIS — F10.11 HISTORY OF ALCOHOL ABUSE: ICD-10-CM

## 2021-06-30 RX ORDER — VITAMIN B COMPLEX
1 CAPSULE ORAL DAILY
Qty: 30 CAPSULE | Refills: 5 | Status: SHIPPED | OUTPATIENT
Start: 2021-06-30 | End: 2021-07-28 | Stop reason: SDUPTHER

## 2021-06-30 RX ORDER — AVOBENZONE, HOMOSALATE, OCTISALATE, OCTOCRYLENE 30; 100; 50; 25 MG/ML; MG/ML; MG/ML; MG/ML
4000 SPRAY TOPICAL DAILY
Qty: 120 TABLET | Refills: 5 | Status: SHIPPED | OUTPATIENT
Start: 2021-06-30 | End: 2021-07-28 | Stop reason: SDUPTHER

## 2021-06-30 RX ORDER — FOLIC ACID 1 MG/1
1 TABLET ORAL DAILY
Qty: 30 TABLET | Refills: 5 | Status: SHIPPED | OUTPATIENT
Start: 2021-06-30 | End: 2021-07-28 | Stop reason: SDUPTHER

## 2021-08-05 ENCOUNTER — OFFICE VISIT (OUTPATIENT)
Dept: FAMILY MEDICINE CLINIC | Facility: HOSPITAL | Age: 61
End: 2021-08-05

## 2021-08-05 VITALS — SYSTOLIC BLOOD PRESSURE: 140 MMHG | BODY MASS INDEX: 29.29 KG/M2 | WEIGHT: 210 LBS | DIASTOLIC BLOOD PRESSURE: 70 MMHG

## 2021-08-05 DIAGNOSIS — I10 ESSENTIAL HYPERTENSION: Primary | ICD-10-CM

## 2021-08-05 PROBLEM — M54.9 CHRONIC BACK PAIN: Status: ACTIVE | Noted: 2021-08-05

## 2021-08-05 PROBLEM — G89.29 CHRONIC BACK PAIN: Status: ACTIVE | Noted: 2021-08-05

## 2021-08-05 PROCEDURE — 99212 OFFICE O/P EST SF 10 MIN: CPT | Performed by: PHYSICIAN ASSISTANT

## 2021-08-05 RX ORDER — METOPROLOL SUCCINATE 25 MG/1
25 TABLET, EXTENDED RELEASE ORAL DAILY
Qty: 30 TABLET | Refills: 3 | Status: SHIPPED | OUTPATIENT
Start: 2021-08-05 | End: 2021-09-03 | Stop reason: SDUPTHER

## 2021-08-05 NOTE — PROGRESS NOTES
Assessment/Plan:           Problem List Items Addressed This Visit        Cardiovascular and Mediastinum    Hypertension - Primary    Relevant Medications    metoprolol succinate (TOPROL-XL) 25 mg 24 hr tablet        Irregular heartbeat-  Started toprol patient has no sx  Subjective:      Patient ID: Lui Lopez is a 59 y o  male  Presents for 6 month follow up  Has been monitoring blood pressure, was 110-105/65 with wrist monitor; at the store machine-  Yesterday- 150/100  Stopped regular alcohol use, May 2021; still smoking, down to 9 cigs daily; and denies drug use  Helping younger sister, with left sided partial paralysis, due to stroke, has to take her to rehab  Review of Systems   Constitutional: Negative for appetite change, chills, diaphoresis, fatigue and fever  HENT: Negative for congestion, ear pain, rhinorrhea and sore throat  Respiratory: Positive for cough  Negative for chest tightness and shortness of breath  Still smoking, down to 9 a day  Cardiovascular: Negative for chest pain, palpitations and leg swelling  Gastrointestinal: Negative for abdominal pain, constipation, diarrhea, nausea and vomiting  Rarely has nausea, or diarrhea  Musculoskeletal: Positive for arthralgias, back pain and myalgias  Negative for neck pain and neck stiffness  Has knee pains  Neurological: Negative for dizziness, tremors, weakness, light-headedness, numbness and headaches  Psychiatric/Behavioral: Positive for sleep disturbance  Negative for dysphoric mood, self-injury and suicidal ideas  The patient is not nervous/anxious  Objective: There were no vitals taken for this visit  Physical Exam  Vitals and nursing note reviewed  Constitutional:       General: He is not in acute distress  Appearance: Normal appearance  He is not ill-appearing, toxic-appearing or diaphoretic  Cardiovascular:      Rate and Rhythm: Normal rate  Rhythm irregular  Pulses: Normal pulses  Heart sounds: Normal heart sounds  Pulmonary:      Effort: Pulmonary effort is normal  No respiratory distress  Breath sounds: Normal breath sounds  No stridor  No wheezing or rhonchi  Musculoskeletal:         General: No swelling, tenderness, deformity or signs of injury  Normal range of motion  Right lower leg: No edema  Left lower leg: No edema  Neurological:      General: No focal deficit present  Mental Status: He is alert and oriented to person, place, and time

## 2021-08-05 NOTE — PATIENT INSTRUCTIONS
Monitor blood pressure, goal is below 140/90  Start Toprol, and stop smoking, return to office in 4 weeks

## 2021-09-03 ENCOUNTER — OFFICE VISIT (OUTPATIENT)
Dept: FAMILY MEDICINE CLINIC | Facility: HOSPITAL | Age: 61
End: 2021-09-03

## 2021-09-03 VITALS — SYSTOLIC BLOOD PRESSURE: 170 MMHG | BODY MASS INDEX: 29.01 KG/M2 | DIASTOLIC BLOOD PRESSURE: 90 MMHG | WEIGHT: 208 LBS

## 2021-09-03 DIAGNOSIS — I10 ESSENTIAL HYPERTENSION: ICD-10-CM

## 2021-09-03 PROCEDURE — 99212 OFFICE O/P EST SF 10 MIN: CPT | Performed by: PHYSICIAN ASSISTANT

## 2021-09-03 RX ORDER — METOPROLOL SUCCINATE 50 MG/1
50 TABLET, EXTENDED RELEASE ORAL DAILY
Qty: 30 TABLET | Refills: 3 | Status: SHIPPED | OUTPATIENT
Start: 2021-09-03 | End: 2021-10-07 | Stop reason: SDUPTHER

## 2021-09-03 NOTE — PROGRESS NOTES
Assessment/Plan:       Problem List Items Addressed This Visit        Cardiovascular and Mediastinum    Hypertension- poorly controlled-      Relevant Medications    metoprolol succinate (TOPROL-XL) 50 mg 24 hr tablet            Subjective:      Patient ID: Paulette Lenz is a 59 y o  male  Presents for follow up on elevated and poorly controlled blood pressure  Has been monitoring blood pressure, and past Monday, he had 148/100, does not believe new medication is working  Not currently working, and has no insurance  Review of Systems   Constitutional: Negative for chills, diaphoresis, fatigue and fever  Eyes: Negative for pain, discharge and visual disturbance  Respiratory: Negative for cough, chest tightness and shortness of breath  Cardiovascular: Negative for chest pain, palpitations and leg swelling  Neurological: Negative for dizziness, tremors, weakness, light-headedness, numbness and headaches  Objective:      /90 (BP Location: Left arm, Patient Position: Sitting, Cuff Size: Standard)   Wt 94 3 kg (208 lb)   BMI 29 01 kg/m²          Physical Exam  Vitals reviewed  Constitutional:       General: He is not in acute distress  Appearance: Normal appearance  He is not ill-appearing, toxic-appearing or diaphoretic  Cardiovascular:      Rate and Rhythm: Normal rate and regular rhythm  Pulses: Normal pulses  Heart sounds: Normal heart sounds  Pulmonary:      Effort: Pulmonary effort is normal  No respiratory distress  Breath sounds: Normal breath sounds  No stridor  No wheezing, rhonchi or rales  Chest:      Chest wall: No tenderness  Musculoskeletal:         General: No swelling, tenderness, deformity or signs of injury  Normal range of motion  Right lower leg: No edema  Left lower leg: No edema  Neurological:      General: No focal deficit present  Mental Status: He is alert and oriented to person, place, and time  Cranial Nerves: No cranial nerve deficit  Sensory: No sensory deficit  Motor: No weakness  Coordination: Coordination normal          BMI Counseling: Body mass index is 29 01 kg/m²  The BMI is above normal  Nutrition recommendations include 3-5 servings of fruits/vegetables daily

## 2021-09-03 NOTE — PATIENT INSTRUCTIONS
Weight Management   AMBULATORY CARE:   Why it is important to manage your weight:  Being overweight increases your risk of health conditions such as heart disease, high blood pressure, type 2 diabetes, and certain types of cancer  It can also increase your risk for osteoarthritis, sleep apnea, and other respiratory problems  Aim for a slow, steady weight loss  Even a small amount of weight loss can lower your risk of health problems  How to lose weight safely:  A safe and healthy way to lose weight is to eat fewer calories and get regular exercise  · You can lose up about 1 pound a week by decreasing the number of calories you eat by 500 calories each day  You can decrease calories by eating smaller portion sizes or by cutting out high-calorie foods  Read labels to find out how many calories are in the foods you eat  · You can also burn calories with exercise such as walking, swimming, or biking  You will be more likely to keep weight off if you make these changes part of your lifestyle  Exercise at least 30 minutes per day on most days of the week  You can also fit in more physical activity by taking the stairs instead of the elevator or parking farther away from stores  Ask your healthcare provider about the best exercise plan for you  Healthy meal plan for weight management:  A healthy meal plan includes a variety of foods, contains fewer calories, and helps you stay healthy  A healthy meal plan includes the following:     · Eat whole-grain foods more often  A healthy meal plan should contain fiber  Fiber is the part of grains, fruits, and vegetables that is not broken down by your body  Whole-grain foods are healthy and provide extra fiber in your diet  Some examples of whole-grain foods are whole-wheat breads and pastas, oatmeal, brown rice, and bulgur  · Eat a variety of vegetables every day  Include dark, leafy greens such as spinach, kale, vikki greens, and mustard greens   Eat yellow and orange vegetables such as carrots, sweet potatoes, and winter squash  · Eat a variety of fruits every day  Choose fresh or canned fruit (canned in its own juice or light syrup) instead of juice  Fruit juice has very little or no fiber  · Eat low-fat dairy foods  Drink fat-free (skim) milk or 1% milk  Eat fat-free yogurt and low-fat cottage cheese  Try low-fat cheeses such as mozzarella and other reduced-fat cheeses  · Choose meat and other protein foods that are low in fat  Choose beans or other legumes such as split peas or lentils  Choose fish, skinless poultry (chicken or turkey), or lean cuts of red meat (beef or pork)  Before you cook meat or poultry, cut off any visible fat  · Use less fat and oil  Try baking foods instead of frying them  Add less fat, such as margarine, sour cream, regular salad dressing and mayonnaise to foods  Eat fewer high-fat foods  Some examples of high-fat foods include french fries, doughnuts, ice cream, and cakes  · Eat fewer sweets  Limit foods and drinks that are high in sugar  This includes candy, cookies, regular soda, and sweetened drinks  Ways to decrease calories:   · Eat smaller portions  ? Use a small plate with smaller servings  ? Do not eat second helpings  ? When you eat at a restaurant, ask for a box and place half of your meal in the box before you eat  ? Share an entrée with someone else  · Replace high-calorie snacks with healthy, low-calorie snacks  ? Choose fresh fruit, vegetables, fat-free rice cakes, or air-popped popcorn instead of potato chips, nuts, or chocolate  ? Choose water or calorie-free drinks instead of soda or sweetened drinks  · Do not shop for groceries when you are hungry  You may be more likely to make unhealthy food choices  Take a grocery list of healthy foods and shop after you have eaten  · Eat regular meals  Do not skip meals  Skipping meals can lead to overeating later in the day   This can make it harder for you to lose weight  Eat a healthy snack in place of a meal if you do not have time to eat a regular meal  Talk with a dietitian to help you create a meal plan and schedule that is right for you  Other things to consider as you try to lose weight:   · Be aware of situations that may give you the urge to overeat, such as eating while watching television  Find ways to avoid these situations  For example, read a book, go for a walk, or do crafts  · Meet with a weight loss support group or friends who are also trying to lose weight  This may help you stay motivated to continue working on your weight loss goals  © Copyright [employer] 2021 Information is for End User's use only and may not be sold, redistributed or otherwise used for commercial purposes  All illustrations and images included in CareNotes® are the copyrighted property of A D A M [employer] Inc  or [employer]ley [employer]   The above information is an  only  It is not intended as medical advice for individual conditions or treatments  Talk to your doctor, nurse or pharmacist before following any medical regimen to see if it is safe and effective for you

## 2021-10-07 ENCOUNTER — OFFICE VISIT (OUTPATIENT)
Dept: FAMILY MEDICINE CLINIC | Facility: HOSPITAL | Age: 61
End: 2021-10-07

## 2021-10-07 VITALS
SYSTOLIC BLOOD PRESSURE: 130 MMHG | BODY MASS INDEX: 29.85 KG/M2 | WEIGHT: 213.2 LBS | OXYGEN SATURATION: 97 % | HEART RATE: 83 BPM | DIASTOLIC BLOOD PRESSURE: 80 MMHG | HEIGHT: 71 IN

## 2021-10-07 DIAGNOSIS — Z23 NEED FOR INFLUENZA VACCINATION: Primary | ICD-10-CM

## 2021-10-07 DIAGNOSIS — F10.11 HISTORY OF ALCOHOL ABUSE: ICD-10-CM

## 2021-10-07 DIAGNOSIS — I10 ESSENTIAL HYPERTENSION: ICD-10-CM

## 2021-10-07 PROCEDURE — 99212 OFFICE O/P EST SF 10 MIN: CPT | Performed by: PHYSICIAN ASSISTANT

## 2021-10-07 PROCEDURE — 90471 IMMUNIZATION ADMIN: CPT

## 2021-10-07 PROCEDURE — 90682 RIV4 VACC RECOMBINANT DNA IM: CPT

## 2021-10-07 RX ORDER — VITAMIN B COMPLEX
1 CAPSULE ORAL DAILY
Qty: 30 CAPSULE | Refills: 5 | Status: SHIPPED | OUTPATIENT
Start: 2021-10-07 | End: 2022-04-28 | Stop reason: SDUPTHER

## 2021-10-07 RX ORDER — FOLIC ACID 1 MG/1
1 TABLET ORAL DAILY
Qty: 30 TABLET | Refills: 5 | Status: SHIPPED | OUTPATIENT
Start: 2021-10-07

## 2021-10-07 RX ORDER — METOPROLOL SUCCINATE 50 MG/1
50 TABLET, EXTENDED RELEASE ORAL DAILY
Qty: 90 TABLET | Refills: 3 | Status: SHIPPED | OUTPATIENT
Start: 2021-10-07 | End: 2022-04-26 | Stop reason: SDUPTHER

## 2022-03-18 DIAGNOSIS — I10 ESSENTIAL HYPERTENSION: ICD-10-CM

## 2022-03-18 RX ORDER — LISINOPRIL 20 MG/1
20 TABLET ORAL DAILY
Qty: 30 TABLET | Refills: 0 | Status: SHIPPED | OUTPATIENT
Start: 2022-03-18 | End: 2022-04-08 | Stop reason: SDUPTHER

## 2022-04-08 ENCOUNTER — OFFICE VISIT (OUTPATIENT)
Dept: FAMILY MEDICINE CLINIC | Facility: HOSPITAL | Age: 62
End: 2022-04-08
Payer: COMMERCIAL

## 2022-04-08 VITALS
DIASTOLIC BLOOD PRESSURE: 100 MMHG | OXYGEN SATURATION: 96 % | HEART RATE: 79 BPM | SYSTOLIC BLOOD PRESSURE: 180 MMHG | WEIGHT: 220 LBS | BODY MASS INDEX: 30.8 KG/M2 | HEIGHT: 71 IN

## 2022-04-08 DIAGNOSIS — I10 ESSENTIAL HYPERTENSION: Primary | ICD-10-CM

## 2022-04-08 DIAGNOSIS — J40 BRONCHITIS: ICD-10-CM

## 2022-04-08 DIAGNOSIS — I10 PRIMARY HYPERTENSION: ICD-10-CM

## 2022-04-08 DIAGNOSIS — J98.01 BRONCHOSPASM: ICD-10-CM

## 2022-04-08 PROCEDURE — 99212 OFFICE O/P EST SF 10 MIN: CPT | Performed by: PHYSICIAN ASSISTANT

## 2022-04-08 RX ORDER — AMLODIPINE BESYLATE 5 MG/1
5 TABLET ORAL DAILY
Qty: 90 TABLET | Refills: 5 | Status: SHIPPED | OUTPATIENT
Start: 2022-04-08 | End: 2022-06-25 | Stop reason: SDUPTHER

## 2022-04-08 RX ORDER — LISINOPRIL 20 MG/1
20 TABLET ORAL DAILY
Qty: 90 TABLET | Refills: 3
Start: 2022-04-08 | End: 2022-04-26 | Stop reason: SDUPTHER

## 2022-04-08 RX ORDER — ALBUTEROL SULFATE 90 UG/1
1-2 AEROSOL, METERED RESPIRATORY (INHALATION) EVERY 6 HOURS PRN
Qty: 8 G | Refills: 3 | Status: SHIPPED | OUTPATIENT
Start: 2022-04-08 | End: 2022-05-29 | Stop reason: SDUPTHER

## 2022-04-08 NOTE — PROGRESS NOTES
Assessment/Plan:         Problem List Items Addressed This Visit        Cardiovascular and Mediastinum    Hypertension- out of control-      Relevant Medications    amLODIPine (NORVASC) 5 mg tablet    lisinopril (ZESTRIL) 20 mg tablet      Other Visit Diagnoses     Essential hypertension    -  Primary    Relevant Medications    Add -    amLODIPine (NORVASC) 5 mg tablet    lisinopril (ZESTRIL) 20 mg tablet  Continue to monitor     Bronchitis        Relevant Medications    albuterol (PROVENTIL HFA,VENTOLIN HFA) 90 mcg/act inhaler    Bronchospasm                Subjective:      Patient ID: Miriam Chery is a 59 y o  male  Presents for routine follow up  Stopped smoking 4 days ago, in the process of quitting  Has been congested on and off, past few weeks  Review of Systems   Constitutional: Negative for chills, diaphoresis, fatigue and fever  HENT: Positive for congestion and rhinorrhea  Negative for ear pain and sore throat  Respiratory: Positive for cough and wheezing  Negative for chest tightness and shortness of breath  Cardiovascular: Negative for chest pain and palpitations  Objective:      /98 (BP Location: Right arm, Patient Position: Sitting, Cuff Size: Standard)   Pulse 79   Ht 5' 11" (1 803 m)   Wt 99 8 kg (220 lb)   SpO2 96%   BMI 30 68 kg/m²          Physical Exam  Vitals reviewed  Constitutional:       General: He is not in acute distress  Appearance: Normal appearance  He is not ill-appearing, toxic-appearing or diaphoretic  HENT:      Head: Normocephalic and atraumatic  Cardiovascular:      Rate and Rhythm: Normal rate and regular rhythm  Pulses: Normal pulses  Heart sounds: Murmur heard  Pulmonary:      Effort: Pulmonary effort is normal       Breath sounds: Wheezing present  Musculoskeletal:         General: No swelling, tenderness, deformity or signs of injury  Normal range of motion  Right lower leg: No edema        Left lower leg: No edema  Neurological:      General: No focal deficit present  Mental Status: He is alert and oriented to person, place, and time  Psychiatric:         Mood and Affect: Mood normal          Behavior: Behavior normal          Thought Content:  Thought content normal          Judgment: Judgment normal

## 2022-04-26 DIAGNOSIS — I10 ESSENTIAL HYPERTENSION: ICD-10-CM

## 2022-04-27 ENCOUNTER — TELEPHONE (OUTPATIENT)
Dept: FAMILY MEDICINE CLINIC | Facility: HOSPITAL | Age: 62
End: 2022-04-27

## 2022-04-27 DIAGNOSIS — I10 ESSENTIAL HYPERTENSION: ICD-10-CM

## 2022-04-27 RX ORDER — LISINOPRIL 20 MG/1
20 TABLET ORAL DAILY
Qty: 90 TABLET | Refills: 3
Start: 2022-04-27 | End: 2022-04-27 | Stop reason: SDUPTHER

## 2022-04-27 RX ORDER — METOPROLOL SUCCINATE 50 MG/1
50 TABLET, EXTENDED RELEASE ORAL DAILY
Qty: 90 TABLET | Refills: 3 | Status: SHIPPED | OUTPATIENT
Start: 2022-04-27 | End: 2022-04-27 | Stop reason: SDUPTHER

## 2022-04-27 RX ORDER — METOPROLOL SUCCINATE 50 MG/1
50 TABLET, EXTENDED RELEASE ORAL DAILY
Qty: 90 TABLET | Refills: 0 | Status: SHIPPED | OUTPATIENT
Start: 2022-04-27 | End: 2022-04-27 | Stop reason: SDUPTHER

## 2022-04-27 RX ORDER — LISINOPRIL 20 MG/1
20 TABLET ORAL DAILY
Qty: 90 TABLET | Refills: 3 | Status: SHIPPED | OUTPATIENT
Start: 2022-04-27 | End: 2022-07-18 | Stop reason: SDUPTHER

## 2022-04-27 NOTE — TELEPHONE ENCOUNTER
Please inform patient metoprolol he is getting is extended release, or once daily, if he wants he can take the 100 mg  Instead of the 50 mg   But it is once a day type of medication, XL

## 2022-04-27 NOTE — TELEPHONE ENCOUNTER
Patient reporting to the pharmacy he is taking the metoprolol bid, not once daily as script is written  Which is correct?

## 2022-05-09 ENCOUNTER — OFFICE VISIT (OUTPATIENT)
Dept: FAMILY MEDICINE CLINIC | Facility: HOSPITAL | Age: 62
End: 2022-05-09
Payer: COMMERCIAL

## 2022-05-09 VITALS
WEIGHT: 219 LBS | DIASTOLIC BLOOD PRESSURE: 80 MMHG | OXYGEN SATURATION: 96 % | HEART RATE: 77 BPM | BODY MASS INDEX: 30.54 KG/M2 | SYSTOLIC BLOOD PRESSURE: 120 MMHG

## 2022-05-09 DIAGNOSIS — I10 PRIMARY HYPERTENSION: ICD-10-CM

## 2022-05-09 DIAGNOSIS — G89.29 CHRONIC PAIN OF LEFT KNEE: ICD-10-CM

## 2022-05-09 DIAGNOSIS — M25.562 CHRONIC PAIN OF LEFT KNEE: ICD-10-CM

## 2022-05-09 DIAGNOSIS — E87.1 HYPONATREMIA: ICD-10-CM

## 2022-05-09 DIAGNOSIS — I10 ESSENTIAL HYPERTENSION: Primary | ICD-10-CM

## 2022-05-09 DIAGNOSIS — F10.11 HISTORY OF ALCOHOL ABUSE: ICD-10-CM

## 2022-05-09 PROCEDURE — 3079F DIAST BP 80-89 MM HG: CPT | Performed by: PHYSICIAN ASSISTANT

## 2022-05-09 PROCEDURE — 4004F PT TOBACCO SCREEN RCVD TLK: CPT | Performed by: PHYSICIAN ASSISTANT

## 2022-05-09 PROCEDURE — 99213 OFFICE O/P EST LOW 20 MIN: CPT | Performed by: PHYSICIAN ASSISTANT

## 2022-05-09 PROCEDURE — 3074F SYST BP LT 130 MM HG: CPT | Performed by: PHYSICIAN ASSISTANT

## 2022-05-09 NOTE — PROGRESS NOTES
Assessment/Plan:           Problem List Items Addressed This Visit        Cardiovascular and Mediastinum    Hypertension - Primary    Relevant Orders    Iron    PSA (Reflex To Free) (Serial)    CBC and differential    Comprehensive metabolic panel    Lipid panel       Other      Other Visit Diagnoses     Essential hypertension        Relevant Orders    Iron    PSA (Reflex To Free) (Serial)    CBC and differential    Comprehensive metabolic panel    Lipid panel    History of alcohol abuse        Relevant Orders    Iron    PSA (Reflex To Free) (Serial)    CBC and differential    Comprehensive metabolic panel    Lipid panel    Chronic pain of left knee        Relevant Orders    Iron    PSA (Reflex To Free) (Serial)    CBC and differential    Comprehensive metabolic panel    Lipid panel            Subjective:      Patient ID: Ritika Molina is a 57 y o  male  Presents for follow up  Has been monitoring  Blood pressure: Today, prior to coming for apt  At [facility] aid, pharmacy:    142/87, 126/89, 132/89  Has         Review of Systems   Constitutional: Negative for chills, diaphoresis, fatigue and fever  HENT: Positive for congestion, postnasal drip and rhinorrhea  Negative for ear pain and sore throat  Respiratory: Positive for cough  Negative for chest tightness and shortness of breath  Cough much improved, inhaler help  Gastrointestinal: Positive for diarrhea  Negative for abdominal pain, constipation, nausea and vomiting  Diarrhea, on occasion  Neurological: Negative for dizziness, light-headedness, numbness and headaches  Objective:      /84   Pulse 77   Wt 99 3 kg (219 lb)   SpO2 96%   BMI 30 54 kg/m²          Physical Exam  Vitals reviewed  Constitutional:       General: He is not in acute distress  Appearance: Normal appearance  He is not ill-appearing, toxic-appearing or diaphoretic  HENT:      Head: Normocephalic and atraumatic     Cardiovascular: Rate and Rhythm: Normal rate and regular rhythm  Pulses: Normal pulses  Heart sounds: Normal heart sounds  Pulmonary:      Effort: Pulmonary effort is normal       Breath sounds: Normal breath sounds  Musculoskeletal:         General: No swelling, deformity or signs of injury  Normal range of motion  Right lower leg: No edema  Left lower leg: No edema  Neurological:      General: No focal deficit present  Mental Status: He is alert and oriented to person, place, and time  Cranial Nerves: No cranial nerve deficit  Sensory: No sensory deficit  Motor: No weakness  Coordination: Coordination normal    Psychiatric:         Mood and Affect: Mood normal          Behavior: Behavior normal          Thought Content:  Thought content normal          Judgment: Judgment normal

## 2022-05-09 NOTE — PATIENT INSTRUCTIONS
Try over the counter allergy meds  As needed, Zyrtec or claritin  Try saline nasal flushes for allergies

## 2022-05-11 LAB
ALBUMIN SERPL-MCNC: 4.6 G/DL (ref 3.8–4.8)
ALBUMIN/GLOB SERPL: 1.8 {RATIO} (ref 1.2–2.2)
ALP SERPL-CCNC: 121 IU/L (ref 44–121)
ALT SERPL-CCNC: 19 IU/L (ref 0–44)
AST SERPL-CCNC: 15 IU/L (ref 0–40)
BASOPHILS # BLD AUTO: 0.1 X10E3/UL (ref 0–0.2)
BASOPHILS NFR BLD AUTO: 1 %
BILIRUB SERPL-MCNC: 0.7 MG/DL (ref 0–1.2)
BUN SERPL-MCNC: 17 MG/DL (ref 8–27)
BUN/CREAT SERPL: 23 (ref 10–24)
CALCIUM SERPL-MCNC: 9.9 MG/DL (ref 8.6–10.2)
CHLORIDE SERPL-SCNC: 94 MMOL/L (ref 96–106)
CHOLEST SERPL-MCNC: 159 MG/DL (ref 100–199)
CHOLEST/HDLC SERPL: 1.9 RATIO (ref 0–5)
CO2 SERPL-SCNC: 21 MMOL/L (ref 20–29)
CREAT SERPL-MCNC: 0.73 MG/DL (ref 0.76–1.27)
EGFR: 102 ML/MIN/1.73
EOSINOPHIL # BLD AUTO: 0.1 X10E3/UL (ref 0–0.4)
EOSINOPHIL NFR BLD AUTO: 1 %
ERYTHROCYTE [DISTWIDTH] IN BLOOD BY AUTOMATED COUNT: 13.2 % (ref 11.6–15.4)
GLOBULIN SER-MCNC: 2.5 G/DL (ref 1.5–4.5)
GLUCOSE SERPL-MCNC: 109 MG/DL (ref 65–99)
HCT VFR BLD AUTO: 39.8 % (ref 37.5–51)
HDLC SERPL-MCNC: 82 MG/DL
HGB BLD-MCNC: 13.9 G/DL (ref 13–17.7)
IMM GRANULOCYTES # BLD: 0.1 X10E3/UL (ref 0–0.1)
IMM GRANULOCYTES NFR BLD: 1 %
IRON SERPL-MCNC: 73 UG/DL (ref 38–169)
LDLC SERPL CALC-MCNC: 63 MG/DL (ref 0–99)
LYMPHOCYTES # BLD AUTO: 1.5 X10E3/UL (ref 0.7–3.1)
LYMPHOCYTES NFR BLD AUTO: 16 %
MCH RBC QN AUTO: 34.7 PG (ref 26.6–33)
MCHC RBC AUTO-ENTMCNC: 34.9 G/DL (ref 31.5–35.7)
MCV RBC AUTO: 99 FL (ref 79–97)
MONOCYTES # BLD AUTO: 0.9 X10E3/UL (ref 0.1–0.9)
MONOCYTES NFR BLD AUTO: 9 %
NEUTROPHILS # BLD AUTO: 7 X10E3/UL (ref 1.4–7)
NEUTROPHILS NFR BLD AUTO: 72 %
PLATELET # BLD AUTO: 207 X10E3/UL (ref 150–450)
POTASSIUM SERPL-SCNC: 4.7 MMOL/L (ref 3.5–5.2)
PROT SERPL-MCNC: 7.1 G/DL (ref 6–8.5)
PSA SERPL-MCNC: 1.6 NG/ML (ref 0–4)
RBC # BLD AUTO: 4.01 X10E6/UL (ref 4.14–5.8)
SL AMB REFLEX CRITERIA: NORMAL
SL AMB VLDL CHOLESTEROL CALC: 14 MG/DL (ref 5–40)
SODIUM SERPL-SCNC: 135 MMOL/L (ref 134–144)
TRIGL SERPL-MCNC: 76 MG/DL (ref 0–149)
WBC # BLD AUTO: 9.6 X10E3/UL (ref 3.4–10.8)

## 2022-05-20 NOTE — DISCHARGE INSTRUCTIONS
1  Continue antibiotics until completion  2  Ice and elevation as needed for swelling  3  Keep wrist and fingers moving  4  Follow up in office with Dr Sumeet Gifford if swelling worsens, if erythema returns or if develop fever or chills  Follow-up with PCP in 1 week  Follow-up with orthopedics as outpatient  Return to ER with any fever, chills, worsening swelling of right forearm or any other alarming symptoms  Chief Complaint   Patient presents with    Rash     arms and leg since yesterday        1. Have you been to the ER, urgent care clinic since your last visit? Hospitalized since your last visit? No    2. Have you seen or consulted any other health care providers outside of the [address] [place] [address] since your last visit? Include any pap smears or colon screening. No     Pt is having rash/allergic reaction since yesterday evening. Dad denies any out of the ordinary activity or recent changes.      Visit Vitals  Temp 97.6 °F (36.4 °C) (Temporal)   Ht (!) 3' 3.09\" (0.993 m)   Wt 35 lb 9.6 oz (16.1 kg)   BMI 16.38 kg/m²

## 2022-05-31 DIAGNOSIS — J40 BRONCHITIS: ICD-10-CM

## 2022-05-31 RX ORDER — ALBUTEROL SULFATE 90 UG/1
1-2 AEROSOL, METERED RESPIRATORY (INHALATION) EVERY 6 HOURS PRN
Qty: 8 G | Refills: 1 | Status: SHIPPED | OUTPATIENT
Start: 2022-05-31

## 2022-07-18 DIAGNOSIS — I10 ESSENTIAL HYPERTENSION: ICD-10-CM

## 2022-07-18 RX ORDER — METOPROLOL SUCCINATE 100 MG/1
100 TABLET, EXTENDED RELEASE ORAL DAILY
Qty: 90 TABLET | Refills: 0 | Status: SHIPPED | OUTPATIENT
Start: 2022-07-18 | End: 2022-10-05 | Stop reason: SDUPTHER

## 2022-08-03 ENCOUNTER — TELEPHONE (OUTPATIENT)
Dept: FAMILY MEDICINE CLINIC | Facility: HOSPITAL | Age: 62
End: 2022-08-03

## 2022-08-03 NOTE — TELEPHONE ENCOUNTER
Pt KERRIE on VM stating he would like an order for a shingles vaccine faxed to [facility]

## 2022-08-04 DIAGNOSIS — Z23 NEED FOR SHINGLES VACCINE: Primary | ICD-10-CM

## 2022-08-04 RX ORDER — ZOSTER VACCINE RECOMBINANT, ADJUVANTED 50 MCG/0.5
0.5 KIT INTRAMUSCULAR ONCE
Qty: 1 EACH | Refills: 1 | Status: SHIPPED | OUTPATIENT
Start: 2022-08-04 | End: 2022-08-04

## 2022-09-19 ENCOUNTER — TELEPHONE (OUTPATIENT)
Dept: FAMILY MEDICINE CLINIC | Facility: HOSPITAL | Age: 62
End: 2022-09-19

## 2022-09-19 DIAGNOSIS — I10 ESSENTIAL HYPERTENSION: ICD-10-CM

## 2022-09-19 RX ORDER — AMLODIPINE BESYLATE 5 MG/1
5 TABLET ORAL DAILY
Qty: 90 TABLET | Refills: 1 | Status: SHIPPED | OUTPATIENT
Start: 2022-09-19 | End: 2022-10-05 | Stop reason: SDUPTHER

## 2022-09-19 NOTE — TELEPHONE ENCOUNTER
Pt states he needs a refill for amLODIPine (NORVASC) 5 mg tablet sent to [facility]

## 2022-10-05 DIAGNOSIS — I10 ESSENTIAL HYPERTENSION: ICD-10-CM

## 2022-10-05 DIAGNOSIS — F10.11 HISTORY OF ALCOHOL ABUSE: ICD-10-CM

## 2022-10-05 RX ORDER — LISINOPRIL 20 MG/1
20 TABLET ORAL DAILY
Qty: 90 TABLET | Refills: 1 | Status: SHIPPED | OUTPATIENT
Start: 2022-10-05 | End: 2022-11-04

## 2022-10-05 RX ORDER — METOPROLOL SUCCINATE 100 MG/1
100 TABLET, EXTENDED RELEASE ORAL DAILY
Qty: 90 TABLET | Refills: 0 | Status: SHIPPED | OUTPATIENT
Start: 2022-10-05 | End: 2022-10-22

## 2022-10-05 RX ORDER — AMLODIPINE BESYLATE 5 MG/1
5 TABLET ORAL DAILY
Qty: 90 TABLET | Refills: 1 | Status: SHIPPED | OUTPATIENT
Start: 2022-10-05

## 2022-10-05 RX ORDER — VITAMIN B COMPLEX
1 CAPSULE ORAL DAILY
Qty: 90 CAPSULE | Refills: 1 | Status: SHIPPED | OUTPATIENT
Start: 2022-10-05

## 2022-11-07 ENCOUNTER — HOSPITAL ENCOUNTER (OUTPATIENT)
Dept: RADIOLOGY | Facility: HOSPITAL | Age: 62
Discharge: HOME | End: 2022-11-07
Attending: STUDENT IN AN ORGANIZED HEALTH CARE EDUCATION/TRAINING PROGRAM

## 2022-11-07 ENCOUNTER — OFFICE VISIT (OUTPATIENT)
Dept: FAMILY MEDICINE CLINIC | Facility: HOSPITAL | Age: 62
End: 2022-11-07

## 2022-11-07 VITALS
OXYGEN SATURATION: 95 % | WEIGHT: 215 LBS | DIASTOLIC BLOOD PRESSURE: 90 MMHG | HEIGHT: 71 IN | BODY MASS INDEX: 30.1 KG/M2 | HEART RATE: 69 BPM | SYSTOLIC BLOOD PRESSURE: 128 MMHG

## 2022-11-07 DIAGNOSIS — M25.531 RIGHT WRIST PAIN: ICD-10-CM

## 2022-11-07 DIAGNOSIS — Z12.5 SCREENING FOR PROSTATE CANCER: ICD-10-CM

## 2022-11-07 DIAGNOSIS — Z13.220 SCREENING FOR HYPERLIPIDEMIA: ICD-10-CM

## 2022-11-07 DIAGNOSIS — Z11.4 SCREENING FOR HIV (HUMAN IMMUNODEFICIENCY VIRUS): ICD-10-CM

## 2022-11-07 DIAGNOSIS — R22.31 LOCALIZED SWELLING ON RIGHT HAND: ICD-10-CM

## 2022-11-07 DIAGNOSIS — Z12.11 SCREEN FOR COLON CANCER: ICD-10-CM

## 2022-11-07 DIAGNOSIS — Z13.0 SCREENING FOR IRON DEFICIENCY ANEMIA: ICD-10-CM

## 2022-11-07 DIAGNOSIS — R73.01 IFG (IMPAIRED FASTING GLUCOSE): ICD-10-CM

## 2022-11-07 DIAGNOSIS — Z13.1 SCREENING FOR DIABETES MELLITUS: Primary | ICD-10-CM
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
## 2022-12-28 DIAGNOSIS — I10 ESSENTIAL HYPERTENSION: ICD-10-CM

## 2022-12-28 RX ORDER — AMLODIPINE BESYLATE 5 MG/1
TABLET ORAL
Qty: 90 TABLET | Refills: 1 | Status: SHIPPED | OUTPATIENT
Start: 2022-12-28 | End: 2022-12-31 | Stop reason: SDUPTHER

## 2022-12-31 DIAGNOSIS — I10 ESSENTIAL HYPERTENSION: ICD-10-CM

## 2022-12-31 RX ORDER — METOPROLOL SUCCINATE 100 MG/1
100 TABLET, EXTENDED RELEASE ORAL DAILY
Qty: 30 TABLET | Refills: 1 | Status: SHIPPED | OUTPATIENT
Start: 2022-12-31

## 2022-12-31 RX ORDER — AMLODIPINE BESYLATE 5 MG/1
5 TABLET ORAL DAILY
Qty: 90 TABLET | Refills: 0 | Status: SHIPPED | OUTPATIENT
Start: 2022-12-31

## 2022-12-31 NOTE — TELEPHONE ENCOUNTER
Medication Refill Request     Name Amlodipine   Dose/Frequency 5mg daily  Quantity 90  Verified pharmacy   [x]  Verified ordering Provider   [x]  Does patient have enough for the next 3 days? Yes [x] No []    Medication Refill Request     Name Metoprolol Succinate   Dose/Frequency 100mg daily  Quantity 30  Verified pharmacy   [x]  Verified ordering Provider   [x]  Does patient have enough for the next 3 days? Yes [x] No []    Original request for Amlodipine went to [address] [place] [address] #[id number] KITTY Willett - [address] and pt doesn't use that pharmacy anymore  Would like it sent to [facility] in [facility] on [address]

## 2023-01-13 DIAGNOSIS — I10 ESSENTIAL HYPERTENSION: ICD-10-CM

## 2023-01-13 RX ORDER — LISINOPRIL 20 MG/1
20 TABLET ORAL DAILY
Qty: 90 TABLET | Refills: 1 | Status: SHIPPED | OUTPATIENT
Start: 2023-01-13 | End: 2023-02-12

## 2023-03-02 DIAGNOSIS — I10 ESSENTIAL HYPERTENSION: ICD-10-CM

## 2023-03-02 RX ORDER — METOPROLOL SUCCINATE 100 MG/1
100 TABLET, EXTENDED RELEASE ORAL DAILY
Qty: 30 TABLET | Refills: 5 | Status: SHIPPED | OUTPATIENT
Start: 2023-03-02

## 2023-04-05 LAB — EXTERNAL COLOGUARD RESULT: POSITIVE

## 2023-04-06 DIAGNOSIS — I10 ESSENTIAL HYPERTENSION: ICD-10-CM

## 2023-04-06 RX ORDER — AMLODIPINE BESYLATE 5 MG/1
5 TABLET ORAL DAILY
Qty: 90 TABLET | Refills: 0 | Status: SHIPPED | OUTPATIENT
Start: 2023-04-06

## 2023-04-24 DIAGNOSIS — Z12.11 SCREENING FOR COLON CANCER: Primary | ICD-10-CM

## 2023-05-05 ENCOUNTER — TELEPHONE (OUTPATIENT)
Dept: GASTROENTEROLOGY | Facility: AMBULATORY SURGERY CENTER | Age: 63
End: 2023-05-05

## 2023-05-09 ENCOUNTER — ANESTHESIA EVENT (OUTPATIENT)
Dept: GASTROENTEROLOGY | Facility: AMBULATORY SURGERY CENTER | Age: 63
End: 2023-05-09

## 2023-05-09 ENCOUNTER — HOSPITAL ENCOUNTER (OUTPATIENT)
Dept: GASTROENTEROLOGY | Facility: AMBULATORY SURGERY CENTER | Age: 63
Discharge: HOME | End: 2023-05-09
Attending: INTERNAL MEDICINE

## 2023-05-09 ENCOUNTER — ANESTHESIA (OUTPATIENT)
Dept: GASTROENTEROLOGY | Facility: AMBULATORY SURGERY CENTER | Age: 63
End: 2023-05-09

## 2023-05-09 VITALS
WEIGHT: 220 LBS | HEIGHT: 70 IN | BODY MASS INDEX: 31.5 KG/M2 | HEART RATE: 69 BPM | TEMPERATURE: 98.2 F | OXYGEN SATURATION: 100 % | SYSTOLIC BLOOD PRESSURE: 120 MMHG | RESPIRATION RATE: 16 BRPM | DIASTOLIC BLOOD PRESSURE: 78 MMHG

## 2023-05-09 DIAGNOSIS — Z12.11 SCREENING FOR COLON CANCER: ICD-10-CM

## 2023-05-09 RX ORDER — SODIUM CHLORIDE, SODIUM LACTATE, POTASSIUM CHLORIDE, CALCIUM CHLORIDE 600; 310; 30; 20 MG/100ML; MG/100ML; MG/100ML; MG/100ML
50 INJECTION, SOLUTION INTRAVENOUS CONTINUOUS
Status: DISCONTINUED | OUTPATIENT
Start: 2023-05-09 | End: 2023-05-13 | Stop reason: HOSPADM

## 2023-05-09 RX ORDER — EPHEDRINE SULFATE 50 MG/ML
INJECTION INTRAVENOUS AS NEEDED
Status: DISCONTINUED | OUTPATIENT
Start: 2023-05-09 | End: 2023-05-09

## 2023-05-09 RX ORDER — PROPOFOL 10 MG/ML
INJECTION, EMULSION INTRAVENOUS AS NEEDED
Status: DISCONTINUED | OUTPATIENT
Start: 2023-05-09 | End: 2023-05-09

## 2023-05-09 RX ORDER — SODIUM CHLORIDE, SODIUM LACTATE, POTASSIUM CHLORIDE, CALCIUM CHLORIDE 600; 310; 30; 20 MG/100ML; MG/100ML; MG/100ML; MG/100ML
20 INJECTION, SOLUTION INTRAVENOUS CONTINUOUS
Status: DISCONTINUED | OUTPATIENT
Start: 2023-05-09 | End: 2023-05-09

## 2023-05-09 RX ADMIN — PROPOFOL 50 MG: 10 INJECTION, EMULSION INTRAVENOUS at 09:16

## 2023-05-09 RX ADMIN — PROPOFOL 100 MG: 10 INJECTION, EMULSION INTRAVENOUS at 08:57

## 2023-05-09 RX ADMIN — SODIUM CHLORIDE, SODIUM LACTATE, POTASSIUM CHLORIDE, CALCIUM CHLORIDE 50 ML/HR: 600; 310; 30; 20 INJECTION, SOLUTION INTRAVENOUS at 08:44

## 2023-05-09 RX ADMIN — PROPOFOL 20 MG: 10 INJECTION, EMULSION INTRAVENOUS at 09:11

## 2023-05-09 RX ADMIN — PROPOFOL 40 MG: 10 INJECTION, EMULSION INTRAVENOUS at 09:18

## 2023-05-09 RX ADMIN — EPHEDRINE SULFATE 5 MG: 50 INJECTION INTRAVENOUS at 09:20

## 2023-05-09 RX ADMIN — PROPOFOL 20 MG: 10 INJECTION, EMULSION INTRAVENOUS at 09:08

## 2023-05-09 RX ADMIN — PROPOFOL 50 MG: 10 INJECTION, EMULSION INTRAVENOUS at 08:58

## 2023-05-09 RX ADMIN — PROPOFOL 20 MG: 10 INJECTION, EMULSION INTRAVENOUS at 09:05

## 2023-05-09 RX ADMIN — EPHEDRINE SULFATE 5 MG: 50 INJECTION INTRAVENOUS at 09:07

## 2023-05-09 RX ADMIN — PROPOFOL 30 MG: 10 INJECTION, EMULSION INTRAVENOUS at 09:00

## 2023-05-09 RX ADMIN — PROPOFOL 20 MG: 10 INJECTION, EMULSION INTRAVENOUS at 09:02

## 2023-05-09 RX ADMIN — PROPOFOL 30 MG: 10 INJECTION, EMULSION INTRAVENOUS at 09:03

## 2023-05-09 NOTE — H&P
"History and Physical -  [facility]  Jacob Edwards 70 y o  male MRN: [id number]    HPI: Jacob Edwards is a 70y o  year old male who presents for diagnostic colonoscopy due to positive Cologuard  He has never had a colonoscopy before  No GI complaints and denies family history of colon cancer  REVIEW OF SYSTEMS: Per the HPI, and otherwise unremarkable      Historical Information   Past Medical History:   Diagnosis Date   • Arthritis    • Bronchitis    • Hypertension    • Osteoarthritis     bilateral knees     Past Surgical History:   Procedure Laterality Date   • FRACTURE SURGERY Left     orbital     Social History   Social History     Substance and Sexual Activity   Alcohol Use Yes     Social History     Substance and Sexual Activity   Drug Use No     Social History     Tobacco Use   Smoking Status Some Days   • Types: Cigarettes   Smokeless Tobacco Never   Tobacco Comments    About 2 cigarettes a day some days     Family History   Problem Relation Age of Onset   • Hypertension Sister        Meds/Allergies       Current Outpatient Medications:   •  amLODIPine (NORVASC) 5 mg tablet  •  b complex vitamins capsule  •  Cholecalciferol (RA Vitamin D-3) 25 MCG (1000 UT) tablet  •  folic acid (FOLVITE) 1 mg tablet  •  lisinopril (ZESTRIL) 20 mg tablet  •  metoprolol succinate (TOPROL-XL) 100 mg 24 hr tablet  •  albuterol (PROVENTIL HFA,VENTOLIN HFA) 90 mcg/act inhaler  •  ibuprofen (MOTRIN) 200 mg tablet  •  polyethylene glycol (GOLYTELY) 4000 mL solution    Current Facility-Administered Medications:   •  lactated ringers infusion, 50 mL/hr, Intravenous, Continuous    Allergies   Allergen Reactions   • Other      Thiomersal (contact solution) - pt reaction is eye redness         Objective     /79   Pulse 82   Temp 98 2 °F (36 8 °C) (Temporal)   Resp 15   Ht 5' 10\" (1 778 m)   Wt 99 8 kg (220 lb)   SpO2 98%   BMI 31 57 kg/m²     PHYSICAL EXAM    Gen: NAD AAOx3  Head: " Normocephalic, Atraumatic  CV: S1S2 RRR no m/r/g  CHEST: Clear b/l no c/r/w  ABD: soft, +BS NT/ND  EXT: no edema    ASSESSMENT/PLAN:  This is a 70y o  year old male here for diagnostic colonoscopy due to positive Cologuard, and he is stable and optimized for his procedure

## 2023-05-09 NOTE — ANESTHESIA PREPROCEDURE EVALUATION
Procedure:  COLONOSCOPY    Relevant Problems   CARDIO   (+) Hypertension      MUSCULOSKELETAL   (+) Chronic back pain      NEURO/PSYCH   (+) Chronic back pain        Physical Exam    Airway    Mallampati score: II  TM Distance: >3 FB  Neck ROM: full     Dental   Comment: None loose,     Cardiovascular      Pulmonary      Other Findings        Anesthesia Plan  ASA Score- 2     Anesthesia Type- IV sedation with anesthesia with ASA Monitors  Additional Monitors:   Airway Plan:     Comment: Last of PO bowel prep: 04:30    Patient educated on the possibility for awareness under sedation and of the possibility of airway intervention in the event of an airway or procedural emergency    Plan Factors-Exercise tolerance (METS): >4 METS  Chart reviewed  Patient summary reviewed  Patient is a current smoker  Patient instructed to abstain from smoking on day of procedure  Patient smoked on day of surgery  Induction- intravenous  Postoperative Plan-     Informed Consent- Anesthetic plan and risks discussed with patient  I personally reviewed this patient with the CRNA  Discussed and agreed on the Anesthesia Plan with the CRNA  Paz Benitez

## 2023-05-09 NOTE — ANESTHESIA POSTPROCEDURE EVALUATION
Post-Op Assessment Note    CV Status:  Stable  Pain Score: 0    Pain management: adequate     Mental Status:  Awake   Hydration Status:  Euvolemic   PONV Controlled:  None   Airway Patency:  Patent and adequate      Post Op Vitals Reviewed: Yes      Staff: CRNA         No notable events documented      BP   104/67   Temp      Pulse 80   Resp 16   SpO2 98%

## 2023-05-30 ENCOUNTER — RA CDI HCC (OUTPATIENT)
Dept: OTHER | Facility: HOSPITAL | Age: 63
End: 2023-05-30

## 2023-05-30 NOTE — PROGRESS NOTES
[address]  coding opportunities       Chart reviewed, no opportunity found: CHART REVIEWED, NO OPPORTUNITY FOUND        Patients Insurance     Medicare Insurance: Medicare

## 2023-06-07 ENCOUNTER — OFFICE VISIT (OUTPATIENT)
Dept: FAMILY MEDICINE CLINIC | Facility: HOSPITAL | Age: 63
End: 2023-06-07
Payer: MEDICARE

## 2023-06-07 VITALS
SYSTOLIC BLOOD PRESSURE: 110 MMHG | OXYGEN SATURATION: 96 % | HEIGHT: 70 IN | DIASTOLIC BLOOD PRESSURE: 70 MMHG | BODY MASS INDEX: 30.35 KG/M2 | HEART RATE: 74 BPM | WEIGHT: 212 LBS

## 2023-06-07 DIAGNOSIS — Z72.0 TOBACCO USE: ICD-10-CM

## 2023-06-07 DIAGNOSIS — I10 ESSENTIAL HYPERTENSION: ICD-10-CM

## 2023-06-07 DIAGNOSIS — I10 PRIMARY HYPERTENSION: Primary | ICD-10-CM

## 2023-06-07 DIAGNOSIS — Z87.09 HISTORY OF BRONCHITIS: ICD-10-CM

## 2023-06-07 PROCEDURE — 99214 OFFICE O/P EST MOD 30 MIN: CPT | Performed by: STUDENT IN AN ORGANIZED HEALTH CARE EDUCATION/TRAINING PROGRAM

## 2023-06-07 RX ORDER — LISINOPRIL 20 MG/1
20 TABLET ORAL DAILY
Qty: 90 TABLET | Refills: 3 | Status: SHIPPED | OUTPATIENT
Start: 2023-06-07 | End: 2023-07-07

## 2023-06-07 RX ORDER — AMLODIPINE BESYLATE 5 MG/1
5 TABLET ORAL DAILY
Qty: 90 TABLET | Refills: 3 | Status: SHIPPED | OUTPATIENT
Start: 2023-06-07

## 2023-06-07 RX ORDER — METOPROLOL SUCCINATE 100 MG/1
100 TABLET, EXTENDED RELEASE ORAL DAILY
Qty: 90 TABLET | Refills: 3 | Status: SHIPPED | OUTPATIENT
Start: 2023-06-07

## 2023-06-07 NOTE — PROGRESS NOTES
[address] [facility],     Assessment/Plan:      Diagnosis ICD-10-CM Associated Orders   1  Primary hypertension  I10       2  Essential hypertension  I10 amLODIPine (NORVASC) 5 mg tablet     lisinopril (ZESTRIL) 20 mg tablet     metoprolol succinate (TOPROL-XL) 100 mg 24 hr tablet      3  History of bronchitis  Z87 09       4  Tobacco use  Z72 0       5  BMI 30 0-30 9,adult  Z68 30         • Patient's medications were reviewed and reconciled  Ordered/Re-ordered as above  • Labs from April reviewed & normal    • C/W tobacco cessation efforts  Return in about 6 months (around 12/7/2023) for Annual Medicare Wellness - Initial    • Patient may call or return to office with any questions or concerns  ______________________________________________________________________  Subjective:     Patient ID: Doris Eldridge is a 70 y o  male  HPI  Doris Eldridge  Chief Complaint   Patient presents with   • Follow-up     6 month fu      Lives alone, 2 sisters on ER list    Wife passed in 1998  Wt Readings from Last 3 Encounters:   06/07/23 96 2 kg (212 lb)   05/09/23 99 8 kg (220 lb)   11/07/22 97 5 kg (215 lb)     Feeling well, no new concerns  BP great  Depression Screening Follow-up Plan: Patient's depression screening was positive with a PHQ-2 score of 0  Clinically patient does not have depression  No treatment is required  BMI Counseling: Body mass index is 30 42 kg/m²  The BMI is above normal  Nutrition recommendations include decreasing portion sizes and encouraging healthy choices of fruits and vegetables  Exercise recommendations include moderate physical activity 150 minutes/week and exercising 3-5 times per week  Rationale for BMI follow-up plan is due to patient being overweight or obese  Falls Plan of Care: balance, strength, and gait training instructions were provided  Tobacco Cessation Counseling: Tobacco cessation counseling was provided   The patient is "sincerely urged to quit consumption of tobacco  He is ready to quit tobacco  Currently cutting back, only one cig today  Can't smoke in building  The following portions of the patient's history were reviewed and updated as appropriate: allergies, current medications, past medical history, and problem list     Review of Systems   Constitutional: Negative for chills and fever  Respiratory: Negative for cough and shortness of breath  Cardiovascular: Negative for chest pain and palpitations  Neurological: Negative for light-headedness and headaches  Objective:      Vitals:    06/07/23 1537   BP: 110/70   Pulse: 74   SpO2: 96%      Physical Exam  Vitals reviewed  Constitutional:       General: He is not in acute distress  Appearance: Normal appearance  He is well-developed  He is not ill-appearing  HENT:      Head: Normocephalic and atraumatic  Eyes:      General: No scleral icterus  Right eye: No discharge  Left eye: No discharge  Cardiovascular:      Rate and Rhythm: Normal rate and regular rhythm  Pulses: Normal pulses  Heart sounds: Normal heart sounds  No murmur heard  No friction rub  Pulmonary:      Effort: Pulmonary effort is normal  No respiratory distress  Breath sounds: Normal breath sounds  No stridor  No wheezing  Musculoskeletal:      Cervical back: Normal range of motion  No rigidity  Right lower leg: No edema  Left lower leg: No edema  Skin:     General: Skin is warm and dry  Neurological:      Mental Status: He is alert and oriented to person, place, and time  Gait: Gait normal    Psychiatric:         Mood and Affect: Mood normal          Behavior: Behavior normal          Thought Content: Thought content normal          Judgment: Judgment normal            Portions of the record may have been created with voice recognition software   Occasional wrong word or \"sound alike\" substitutions may have occurred due to the " inherent limitations of voice recognition software  Please review the chart carefully and recognize, using context, where substitutions/typographical errors may have occurred

## 2023-06-15 ENCOUNTER — TELEPHONE (OUTPATIENT)
Dept: GASTROENTEROLOGY | Facility: AMBULARY SURGERY CENTER | Age: 63
End: 2023-06-15

## 2023-06-15 NOTE — TELEPHONE ENCOUNTER
Patients GI provider:  Dr Naren Franks     Number to return call: [phone]     Reason for call: Pt calling stating he received a call 3x today from someone in the office  Would like to know what it is in reference to   Patient can be reached at [phone]     Scheduled procedure/appointment date if applicable: Apt/procedure

## 2023-07-08 DIAGNOSIS — F10.11 HISTORY OF ALCOHOL ABUSE: ICD-10-CM

## 2023-07-10 RX ORDER — VITAMIN B COMPLEX
1 CAPSULE ORAL DAILY
Qty: 90 CAPSULE | Refills: 0 | Status: SHIPPED | OUTPATIENT
Start: 2023-07-10

## 2023-12-20 ENCOUNTER — OFFICE VISIT (OUTPATIENT)
Dept: FAMILY MEDICINE CLINIC | Facility: HOSPITAL | Age: 63
End: 2023-12-20
Payer: MEDICARE

## 2023-12-20 VITALS
WEIGHT: 206 LBS | OXYGEN SATURATION: 94 % | SYSTOLIC BLOOD PRESSURE: 90 MMHG | DIASTOLIC BLOOD PRESSURE: 60 MMHG | HEIGHT: 70 IN | HEART RATE: 72 BPM | BODY MASS INDEX: 29.49 KG/M2

## 2023-12-20 DIAGNOSIS — Z80.42 FAMILY HISTORY OF PROSTATE CANCER: ICD-10-CM

## 2023-12-20 DIAGNOSIS — Z13.220 SCREENING FOR HYPERLIPIDEMIA: ICD-10-CM

## 2023-12-20 DIAGNOSIS — Z13.29 SCREENING FOR THYROID DISORDER: ICD-10-CM

## 2023-12-20 DIAGNOSIS — I10 ESSENTIAL HYPERTENSION: Primary | ICD-10-CM

## 2023-12-20 DIAGNOSIS — Z23 ENCOUNTER FOR IMMUNIZATION: ICD-10-CM

## 2023-12-20 DIAGNOSIS — Z87.891 PERSONAL HISTORY OF NICOTINE DEPENDENCE: ICD-10-CM

## 2023-12-20 DIAGNOSIS — Z72.0 TOBACCO USE: ICD-10-CM

## 2023-12-20 DIAGNOSIS — R35.1 NOCTURIA: ICD-10-CM

## 2023-12-20 DIAGNOSIS — Z00.00 ROUTINE ADULT HEALTH MAINTENANCE: ICD-10-CM

## 2023-12-20 DIAGNOSIS — R73.01 IFG (IMPAIRED FASTING GLUCOSE): ICD-10-CM

## 2023-12-20 DIAGNOSIS — Z12.5 SCREENING FOR PROSTATE CANCER: ICD-10-CM

## 2023-12-20 PROCEDURE — G0402 INITIAL PREVENTIVE EXAM: HCPCS | Performed by: STUDENT IN AN ORGANIZED HEALTH CARE EDUCATION/TRAINING PROGRAM

## 2023-12-20 PROCEDURE — 99214 OFFICE O/P EST MOD 30 MIN: CPT | Performed by: STUDENT IN AN ORGANIZED HEALTH CARE EDUCATION/TRAINING PROGRAM

## 2023-12-20 NOTE — PROGRESS NOTES
Assessment and Plan:      Diagnosis ICD-10-CM Associated Orders   1. Essential hypertension  I10 Comprehensive metabolic panel      2. Encounter for immunization  Z23 influenza vaccine, high-dose, PF 0.7 mL (FLUZONE HIGH-DOSE)      3. Screening for thyroid disorder  Z13.29 TSH, 3rd generation      4. IFG (impaired fasting glucose)  R73.01 CBC and differential     HEMOGLOBIN A1C W/ EAG ESTIMATION      5. Screening for hyperlipidemia  Z13.220 Lipid Panel with Direct LDL reflex      6. Screening for prostate cancer  Z12.5 PSA, total and free      7. Family history of prostate cancer  Z80.42 PSA, total and free      8. Nocturia  R35.1 PSA, total and free      9. Tobacco use  Z72.0 US abdominal aorta screening aaa     CT lung screening program      10. Personal history of nicotine dependence  Z87.891 CT lung screening program      11. Routine adult health maintenance  Z00.00         Studies reviewed as above.   No prior breathing issues or COPD concerns. May need PFT's in the future or inhalers.   Brother just diagnosed with PSA CA.     BP low today, 15 lb weight loss. WILL HOLD AMLODIPINE 5 mg DAILY & CHECK AT NEXT VISIT  Screening labs ordered above. Will confirm new lab site with medicare.   Will r/o with results.     Wt Readings from Last 3 Encounters:   12/20/23 93.4 kg (206 lb)   06/07/23 96.2 kg (212 lb)   05/09/23 99.8 kg (220 lb)     Temp Readings from Last 3 Encounters:   05/09/23 98.2 °F (36.8 °C) (Temporal)   02/08/21 (!) 95.9 °F (35.5 °C)   11/13/20 97.8 °F (36.6 °C)     BP Readings from Last 3 Encounters:   12/20/23 90/60   06/07/23 110/70   05/09/23 120/78     Pulse Readings from Last 3 Encounters:   12/20/23 72   06/07/23 74   05/09/23 69       Depression Screening and Follow-up Plan: Patient was screened for depression during today's encounter. They screened negative with a PHQ-2 score of 0.      Preventive health issues were discussed with patient, and age appropriate screening tests were ordered as  noted in patient's After Visit Summary.  Personalized health advice and appropriate referrals for health education or preventive services given if needed, as noted in patient's After Visit Summary.     History of Present Illness:     Patient presents for a Medicare Wellness Visit    HPI   Patient Care Team:  Maryam Diego DO as PCP - General (Family Medicine)  Moe Rapp MD as PCP - PCP-[facility] ([facility])  Evelia Villa DO as PCP - PCP-[facility] ([facility])  Jonathan Smith DO (Gastroenterology)     Review of Systems:     Review of Systems   Constitutional:  Negative for chills and fever.   Respiratory:  Negative for cough and shortness of breath.    Cardiovascular:  Negative for chest pain and palpitations.   Neurological:  Negative for light-headedness and headaches.      Problem List:     Patient Active Problem List   Diagnosis    Hypertension    Hyponatremia    Chronic back pain    Tobacco use    History of bronchitis    BMI 30.0-30.9,adult      Past Medical and Surgical History:     Past Medical History:   Diagnosis Date    Arthritis     Bronchitis     Hypertension     Osteoarthritis     bilateral knees     Past Surgical History:   Procedure Laterality Date    FRACTURE SURGERY Left     orbital      Family History:     Family History   Problem Relation Age of Onset    Hypertension Sister       Social History:     Social History     Socioeconomic History    Marital status:      Spouse name: None    Number of children: None    Years of education: None    Highest education level: 12th grade   Occupational History    Occupation: glass tech   Tobacco Use    Smoking status: Some Days     Types: Cigarettes    Smokeless tobacco: Never    Tobacco comments:     About 2 cigarettes a day some days   Vaping Use    Vaping status: Never Used   Substance and Sexual Activity    Alcohol use: Not Currently    Drug use: No    Sexual activity: Not Currently     Partners: Female   Other Topics Concern     None   Social History Narrative    Wears seatbelt        Exercise habits     Lives with family      Social Determinants of Health     Financial Resource Strain: Low Risk  (12/20/2023)    Overall Financial Resource Strain (CARDIA)     Difficulty of Paying Living Expenses: Not hard at all   Food Insecurity: No Food Insecurity (5/3/2019)    Hunger Vital Sign     Worried About Running Out of Food in the Last Year: Never true     Ran Out of Food in the Last Year: Never true   Transportation Needs: No Transportation Needs (12/20/2023)    PRAPARE - Transportation     Lack of Transportation (Medical): No     Lack of Transportation (Non-Medical): No   Physical Activity: Sufficiently Active (5/3/2019)    Exercise Vital Sign     Days of Exercise per Week: 5 days     Minutes of Exercise per Session: 150+ min   Stress: No Stress Concern Present (5/3/2019)    [place] [employer] - Occupational Stress Questionnaire     Feeling of Stress : Not at all   Social Connections: Moderately Isolated (5/3/2019)    Social Connection and Isolation Panel [NHANES]     Frequency of Communication with Friends and Family: More than three times a week     Frequency of Social Gatherings with Friends and Family: Never     Attends Caodaism Services: Never     Active Member of Clubs or Organizations: Yes     Attends Club or Organization Meetings: More than 4 times per year     Marital Status:    Intimate Partner Violence: Not At Risk (5/3/2019)    Humiliation, Afraid, Rape, and Kick questionnaire     Fear of Current or Ex-Partner: No     Emotionally Abused: No     Physically Abused: No     Sexually Abused: No   Housing Stability: Not on file      Medications and Allergies:     Current Outpatient Medications   Medication Sig Dispense Refill    b complex vitamins capsule Take 1 capsule by mouth daily 90 capsule 0    Cholecalciferol (RA Vitamin D-3) 25 MCG (1000 UT) tablet Take 4 tablets (4,000 Units total) by mouth  daily 120 tablet 5    folic acid (FOLVITE) 1 mg tablet Take 1 tablet (1 mg total) by mouth daily 30 tablet 5    ibuprofen (MOTRIN) 200 mg tablet Take 2 tablets (400 mg total) by mouth every 6 (six) hours as needed for mild pain 60 tablet 3    lisinopril (ZESTRIL) 20 mg tablet Take 1 tablet (20 mg total) by mouth daily 90 tablet 3    metoprolol succinate (TOPROL-XL) 100 mg 24 hr tablet Take 1 tablet (100 mg total) by mouth daily 90 tablet 3    Multiple Vitamins-Minerals (PRESERVISION AREDS PO) Take by mouth       No current facility-administered medications for this visit.     Allergies   Allergen Reactions    Other      Thiomersal (contact solution) - pt reaction is eye redness        Immunizations:     Immunization History   Administered Date(s) Administered    COVID-19 PFIZER VACCINE 0.3 ML IM 12/22/2021    Influenza, injectable, quadrivalent, preservative free 0.5 mL 09/10/2020    Influenza, recombinant, quadrivalent,injectable, preservative free 11/01/2019, 10/07/2021    Tdap 08/01/2018      Health Maintenance:         Topic Date Due    Colorectal Cancer Screening  05/08/2026    HIV Screening  Completed    Hepatitis C Screening  Completed         Topic Date Due    Pneumococcal Vaccine: 65+ Years (1 - PCV) Never done    Influenza Vaccine (1) 09/01/2023    COVID-19 Vaccine (2 - 2023-24 season) 09/01/2023      Medicare Screening Tests and Risk Assessments:     Rodrigo is here for his Initial Wellness visit.     Health Risk Assessment:   Patient rates overall health as good. Patient feels that their physical health rating is slightly better. Patient is very satisfied with their life. Eyesight was rated as same. Hearing was rated as same. Patient feels that their emotional and mental health rating is same. Patients states they are never, rarely angry. Patient states they are never, rarely unusually tired/fatigued. Pain experienced in the last 7 days has been some. Patient's pain rating has been 2/10. Patient states  that he has experienced no weight loss or gain in last 6 months.     Depression Screening:   PHQ-2 Score: 0      Fall Risk Screening:   In the past year, patient has experienced: no history of falling in past year      Home Safety:  Patient has trouble with stairs inside or outside of their home. Patient has working smoke alarms and has working carbon monoxide detector. Home safety hazards include: none.     Nutrition:   Current diet is Regular.     Medications:   Patient is currently taking over-the-counter supplements. OTC medications include: see medication list. Patient is able to manage medications.     Activities of Daily Living (ADLs)/Instrumental Activities of Daily Living (IADLs):   Walk and transfer into and out of bed and chair?: Yes  Dress and groom yourself?: Yes    Bathe or shower yourself?: Yes    Feed yourself? Yes  Do your laundry/housekeeping?: Yes  Manage your money, pay your bills and track your expenses?: Yes  Make your own meals?: Yes    Do your own shopping?: Yes    Previous Hospitalizations:   Any hospitalizations or ED visits within the last 12 months?: No      Advance Care Planning:     Durable POA for healthcare: Yes    ACP document given: Yes      Comments: Sister Monalisa is POA    Cognitive Screening:   Provider or family/friend/caregiver concerned regarding cognition?: No    PREVENTIVE SCREENINGS      Cardiovascular Screening:    General: Screening Current      Diabetes Screening:     General: Screening Current      Colorectal Cancer Screening:     General: Screening Current      Prostate Cancer Screening:      Due for: PSA      Abdominal Aortic Aneurysm (AAA) Screening:    Risk factors include: age between 65-74 yo and tobacco use      Due for: Screening AAA Ultrasound      Lung Cancer Screening:     General: Screening Not Indicated      Hepatitis C Screening:    General: Screening Current    Screening, Brief Intervention, and Referral to Treatment (SBIRT)    Screening      AUDIT-C  "Screenin) How often did you have a drink containing alcohol in the past year? 2 to 4 times a month  2) How many drinks did you have on a typical day when you were drinking in the past year? 3 to 4  3) How often did you have 6 or more drinks on one occasion in the past year? never    AUDIT-C Score: 2  Interpretation: Score 0-3 (male): Negative screen for alcohol misuse    Single Item Drug Screening:  How often have you used an illegal drug (including marijuana) or a prescription medication for non-medical reasons in the past year? never    Single Item Drug Screen Score: 0  Interpretation: Negative screen for possible drug use disorder    Other Counseling Topics:   Car/seat belt/driving safety, skin self-exam, sunscreen and regular weightbearing exercise and calcium and vitamin D intake.     Vision Screening    Right eye Left eye Both eyes   Without correction      With correction 20/50 20/40 20/25      Physical Exam:     BP 90/60   Pulse 72   Ht 5' 9.75\" (1.772 m)   Wt 93.4 kg (206 lb)   SpO2 94%   BMI 29.77 kg/m²     Physical Exam  Vitals reviewed.   Constitutional:       General: He is not in acute distress.     Appearance: Normal appearance. He is not ill-appearing.   HENT:      Head: Normocephalic and atraumatic.   Eyes:      General: No scleral icterus.        Right eye: No discharge.         Left eye: No discharge.   Cardiovascular:      Rate and Rhythm: Normal rate and regular rhythm.      Pulses: Normal pulses.      Heart sounds: Normal heart sounds. No murmur heard.  Pulmonary:      Effort: Pulmonary effort is normal. No respiratory distress.      Breath sounds: Wheezing (end expiratory) present.   Musculoskeletal:      Cervical back: Normal range of motion and neck supple.      Right lower leg: No edema.      Left lower leg: No edema.   Neurological:      Mental Status: He is alert and oriented to person, place, and time.      Gait: Gait normal.   Psychiatric:         Mood and Affect: Mood normal. "         Behavior: Behavior normal.         Thought Content: Thought content normal.         Judgment: Judgment normal.       Maryam Diego, DO

## 2023-12-20 NOTE — PATIENT INSTRUCTIONS
Call insurance to confirm which lab to use for blood work.   [facility], [facility], [facility], [facility]  BP low today, 15 lb weight loss.   WILL HOLD AMLODIPINE 5 mg DAILY & CHECK AT NEXT VISIT    Medicare Preventive Visit Patient Instructions  Thank you for completing your Welcome to Medicare Visit or Medicare Annual Wellness Visit today. Your next wellness visit will be due in one year (12/20/2024).  The screening/preventive services that you may require over the next 5-10 years are detailed below. Some tests may not apply to you based off risk factors and/or age. Screening tests ordered at today's visit but not completed yet may show as past due. Also, please note that scanned in results may not display below.  Preventive Screenings:  Service Recommendations Previous Testing/Comments   Colorectal Cancer Screening  Colonoscopy    Fecal Occult Blood Test (FOBT)/Fecal Immunochemical Test (FIT)  Fecal DNA/Cologuard Test  Flexible Sigmoidoscopy Age: 45-75 years old   Colonoscopy: every 10 years (May be performed more frequently if at higher risk)  OR  FOBT/FIT: every 1 year  OR  Cologuard: every 3 years  OR  Sigmoidoscopy: every 5 years  Screening may be recommended earlier than age 45 if at higher risk for colorectal cancer. Also, an individualized decision between you and your healthcare provider will decide whether screening between the ages of 76-85 would be appropriate. Colonoscopy: 05/09/2023  FOBT/FIT: Not on file  Cologuard: 04/05/2023  Sigmoidoscopy: Not on file    Screening Current     Prostate Cancer Screening Individualized decision between patient and health care provider in men between ages of 55-69   Medicare will cover every 12 months beginning on the day after your 50th birthday PSA: 1.6 ng/mL           Hepatitis C Screening Once for adults born between 1945 and 1965  More frequently in patients at high risk for Hepatitis C Hep C Antibody: 11/07/2019    Screening Current   Diabetes Screening 1-2 times per year if  you're at risk for diabetes or have pre-diabetes Fasting glucose: No results in last 5 years (No results in last 5 years)  A1C: 5.6 % (4/15/2023)  Screening Current   Cholesterol Screening Once every 5 years if you don't have a lipid disorder. May order more often based on risk factors. Lipid panel: 04/15/2023  Screening Current      Other Preventive Screenings Covered by Medicare:  Abdominal Aortic Aneurysm (AAA) Screening: covered once if your at risk. You're considered to be at risk if you have a family history of AAA or a male between the age of 65-75 who smoking at least 100 cigarettes in your lifetime.  Lung Cancer Screening: covers low dose CT scan once per year if you meet all of the following conditions: (1) Age 55-77; (2) No signs or symptoms of lung cancer; (3) Current smoker or have quit smoking within the last 15 years; (4) You have a tobacco smoking history of at least 20 pack years (packs per day x number of years you smoked); (5) You get a written order from a healthcare provider.  Glaucoma Screening: covered annually if you're considered high risk: (1) You have diabetes OR (2) Family history of glaucoma OR (3)  aged 50 and older OR (4)  American aged 65 and older  Osteoporosis Screening: covered every 2 years if you meet one of the following conditions: (1) Have a vertebral abnormality; (2) On glucocorticoid therapy for more than 3 months; (3) Have primary hyperparathyroidism; (4) On osteoporosis medications and need to assess response to drug therapy.  HIV Screening: covered annually if you're between the age of 15-65. Also covered annually if you are younger than 15 and older than 65 with risk factors for HIV infection. For pregnant patients, it is covered up to 3 times per pregnancy.    Immunizations:  Immunization Recommendations   Influenza Vaccine Annual influenza vaccination during flu season is recommended for all persons aged >= 6 months who do not have  contraindications   Pneumococcal Vaccine   * Pneumococcal conjugate vaccine = PCV13 (Prevnar 13), PCV15 (Vaxneuvance), PCV20 (Prevnar 20)  * Pneumococcal polysaccharide vaccine = PPSV23 (Pneumovax) Adults 19-63 yo with certain risk factors or if 65+ yo  If never received any pneumonia vaccine: recommend Prevnar 20 (PCV20)  Give PCV20 if previously received 1 dose of PCV13 or PPSV23   Hepatitis B Vaccine 3 dose series if at intermediate or high risk (ex: diabetes, end stage renal disease, liver disease)   Respiratory syncytial virus (RSV) Vaccine - COVERED BY MEDICARE PART D  * RSVPreF3 (Arexvy) CDC recommends that adults 60 years of age and older may receive a single dose of RSV vaccine using shared clinical decision-making (SCDM)   Tetanus (Td) Vaccine - COST NOT COVERED BY MEDICARE PART B Following completion of primary series, a booster dose should be given every 10 years to maintain immunity against tetanus. Td may also be given as tetanus wound prophylaxis.   Tdap Vaccine - COST NOT COVERED BY MEDICARE PART B Recommended at least once for all adults. For pregnant patients, recommended with each pregnancy.   Shingles Vaccine (Shingrix) - COST NOT COVERED BY MEDICARE PART B  2 shot series recommended in those 19 years and older who have or will have weakened immune systems or those 50 years and older     Health Maintenance Due:      Topic Date Due    Colorectal Cancer Screening  05/08/2026    HIV Screening  Completed    Hepatitis C Screening  Completed     Immunizations Due:      Topic Date Due    Pneumococcal Vaccine: 65+ Years (1 - PCV) Never done    Influenza Vaccine (1) 09/01/2023    COVID-19 Vaccine (2 - 2023-24 season) 09/01/2023     Advance Directives   What are advance directives?  Advance directives are legal documents that state your wishes and plans for medical care. These plans are made ahead of time in case you lose your ability to make decisions for yourself. Advance directives can apply to any  medical decision, such as the treatments you want, and if you want to donate organs.   What are the types of advance directives?  There are many types of advance directives, and each state has rules about how to use them. You may choose a combination of any of the following:  Living will:  This is a written record of the treatment you want. You can also choose which treatments you do not want, which to limit, and which to stop at a certain time. This includes surgery, medicine, IV fluid, and tube feedings.   Durable power of  for healthcare (DPAHC):  This is a written record that states who you want to make healthcare choices for you when you are unable to make them for yourself. This person, called a proxy, is usually a family member or a friend. You may choose more than 1 proxy.  Do not resuscitate (DNR) order:  A DNR order is used in case your heart stops beating or you stop breathing. It is a request not to have certain forms of treatment, such as CPR. A DNR order may be included in other types of advance directives.  Medical directive:  This covers the care that you want if you are in a coma, near death, or unable to make decisions for yourself. You can list the treatments you want for each condition. Treatment may include pain medicine, surgery, blood transfusions, dialysis, IV or tube feedings, and a ventilator (breathing machine).  Values history:  This document has questions about your views, beliefs, and how you feel and think about life. This information can help others choose the care that you would choose.  Why are advance directives important?  An advance directive helps you control your care. Although spoken wishes may be used, it is better to have your wishes written down. Spoken wishes can be misunderstood, or not followed. Treatments may be given even if you do not want them. An advance directive may make it easier for your family to make difficult choices about your care.   Cigarette  Smoking and Your Health   Risks to your health if you smoke:  Nicotine and other chemicals found in tobacco damage every cell in your body. Even if you are a light smoker, you have an increased risk for cancer, heart disease, and lung disease. If you are pregnant or have diabetes, smoking increases your risk for complications.   Benefits to your health if you stop smoking:   You decrease respiratory symptoms such as coughing, wheezing, and shortness of breath.   You reduce your risk for cancers of the lung, mouth, throat, kidney, bladder, pancreas, stomach, and cervix. If you already have cancer, you increase the benefits of chemotherapy. You also reduce your risk for cancer returning or a second cancer from developing.   You reduce your risk for heart disease, blood clots, heart attack, and stroke.   You reduce your risk for lung infections, and diseases such as pneumonia, asthma, chronic bronchitis, and emphysema.  Your circulation improves. More oxygen can be delivered to your body. If you have diabetes, you lower your risk for complications, such as kidney, artery, and eye diseases. You also lower your risk for nerve damage. Nerve damage can lead to amputations, poor vision, and blindness.  You improve your body's ability to heal and to fight infections.  For more information and support to stop smoking:   [email or web address]  Phone: [phone]  Web Address: www.[employer]  Weight Management   Why it is important to manage your weight:  Being overweight increases your risk of health conditions such as heart disease, high blood pressure, type 2 diabetes, and certain types of cancer. It can also increase your risk for osteoarthritis, sleep apnea, and other respiratory problems. Aim for a slow, steady weight loss. Even a small amount of weight loss can lower your risk of health problems.  How to lose weight safely:  A safe and healthy way to lose weight is to eat fewer calories and get regular exercise. You can  lose up about 1 pound a week by decreasing the number of calories you eat by 500 calories each day.   Healthy meal plan for weight management:  A healthy meal plan includes a variety of foods, contains fewer calories, and helps you stay healthy. A healthy meal plan includes the following:  Eat whole-grain foods more often.  A healthy meal plan should contain fiber. Fiber is the part of grains, fruits, and vegetables that is not broken down by your body. Whole-grain foods are healthy and provide extra fiber in your diet. Some examples of whole-grain foods are whole-wheat breads and pastas, oatmeal, brown rice, and bulgur.  Eat a variety of vegetables every day.  Include dark, leafy greens such as spinach, kale, vikki greens, and mustard greens. Eat yellow and orange vegetables such as carrots, sweet potatoes, and winter squash.   Eat a variety of fruits every day.  Choose fresh or canned fruit (canned in its own juice or light syrup) instead of juice. Fruit juice has very little or no fiber.  Eat low-fat dairy foods.  Drink fat-free (skim) milk or 1% milk. Eat fat-free yogurt and low-fat cottage cheese. Try low-fat cheeses such as mozzarella and other reduced-fat cheeses.  Choose meat and other protein foods that are low in fat.  Choose beans or other legumes such as split peas or lentils. Choose fish, skinless poultry (chicken or turkey), or lean cuts of red meat (beef or pork). Before you cook meat or poultry, cut off any visible fat.   Use less fat and oil.  Try baking foods instead of frying them. Add less fat, such as margarine, sour cream, regular salad dressing and mayonnaise to foods. Eat fewer high-fat foods. Some examples of high-fat foods include french fries, doughnuts, ice cream, and cakes.  Eat fewer sweets.  Limit foods and drinks that are high in sugar. This includes candy, cookies, regular soda, and sweetened drinks.  Exercise:  Exercise at least 30 minutes per day on most days of the week. Some  examples of exercise include walking, biking, dancing, and swimming. You can also fit in more physical activity by taking the stairs instead of the elevator or parking farther away from stores. Ask your healthcare provider about the best exercise plan for you.      © Copyright [employer] 2018 Information is for End User's use only and may not be sold, redistributed or otherwise used for commercial purposes. All illustrations and images included in CareNotes® are the copyrighted property of [employer].A[employer]M., Inc. or [employer]

## 2024-01-02 ENCOUNTER — TELEPHONE (OUTPATIENT)
Dept: FAMILY MEDICINE CLINIC | Facility: HOSPITAL | Age: 64
End: 2024-01-02

## 2024-01-02 NOTE — TELEPHONE ENCOUNTER
Needs letter to [facility] that confirms he has arthritis in his knee and is disabled.    No fax number.    [place] office of human resources  Or [place]. Dept of human resources  [address] [address]      Please call if any questions.

## 2024-01-10 LAB
ALBUMIN SERPL-MCNC: 4.7 G/DL (ref 3.9–4.9)
ALBUMIN/GLOB SERPL: 1.8 {RATIO} (ref 1.2–2.2)
ALP SERPL-CCNC: 119 IU/L (ref 44–121)
ALT SERPL-CCNC: 12 IU/L (ref 0–44)
AST SERPL-CCNC: 13 IU/L (ref 0–40)
BASOPHILS # BLD AUTO: 0.1 X10E3/UL (ref 0–0.2)
BASOPHILS NFR BLD AUTO: 1 %
BILIRUB SERPL-MCNC: <0.2 MG/DL (ref 0–1.2)
BUN SERPL-MCNC: 19 MG/DL (ref 8–27)
BUN/CREAT SERPL: 23 (ref 10–24)
CALCIUM SERPL-MCNC: 9.7 MG/DL (ref 8.6–10.2)
CHLORIDE SERPL-SCNC: 102 MMOL/L (ref 96–106)
CHOLEST SERPL-MCNC: 159 MG/DL (ref 100–199)
CO2 SERPL-SCNC: 23 MMOL/L (ref 20–29)
CREAT SERPL-MCNC: 0.81 MG/DL (ref 0.76–1.27)
EGFR: 98 ML/MIN/1.73
EOSINOPHIL # BLD AUTO: 0.1 X10E3/UL (ref 0–0.4)
EOSINOPHIL NFR BLD AUTO: 2 %
ERYTHROCYTE [DISTWIDTH] IN BLOOD BY AUTOMATED COUNT: 12.9 % (ref 11.6–15.4)
EST. AVERAGE GLUCOSE BLD GHB EST-MCNC: 111 MG/DL
GLOBULIN SER-MCNC: 2.6 G/DL (ref 1.5–4.5)
GLUCOSE SERPL-MCNC: 95 MG/DL (ref 70–99)
HBA1C MFR BLD: 5.5 % (ref 4.8–5.6)
HCT VFR BLD AUTO: 41.9 % (ref 37.5–51)
HDLC SERPL-MCNC: 73 MG/DL
HGB BLD-MCNC: 14.1 G/DL (ref 13–17.7)
IMM GRANULOCYTES # BLD: 0.1 X10E3/UL (ref 0–0.1)
IMM GRANULOCYTES NFR BLD: 1 %
LDLC SERPL CALC-MCNC: 70 MG/DL (ref 0–99)
LDLC/HDLC SERPL: 1 RATIO (ref 0–3.6)
LYMPHOCYTES # BLD AUTO: 2.4 X10E3/UL (ref 0.7–3.1)
LYMPHOCYTES NFR BLD AUTO: 36 %
MCH RBC QN AUTO: 33.8 PG (ref 26.6–33)
MCHC RBC AUTO-ENTMCNC: 33.7 G/DL (ref 31.5–35.7)
MCV RBC AUTO: 101 FL (ref 79–97)
MONOCYTES # BLD AUTO: 0.8 X10E3/UL (ref 0.1–0.9)
MONOCYTES NFR BLD AUTO: 12 %
NEUTROPHILS # BLD AUTO: 3.3 X10E3/UL (ref 1.4–7)
NEUTROPHILS NFR BLD AUTO: 48 %
PLATELET # BLD AUTO: 242 X10E3/UL (ref 150–450)
POTASSIUM SERPL-SCNC: 4.8 MMOL/L (ref 3.5–5.2)
PROT SERPL-MCNC: 7.3 G/DL (ref 6–8.5)
PSA FREE MFR SERPL: 22.5 %
PSA FREE SERPL-MCNC: 0.72 NG/ML
PSA SERPL-MCNC: 3.2 NG/ML (ref 0–4)
RBC # BLD AUTO: 4.17 X10E6/UL (ref 4.14–5.8)
SL AMB VLDL CHOLESTEROL CALC: 16 MG/DL (ref 5–40)
SODIUM SERPL-SCNC: 142 MMOL/L (ref 134–144)
TRIGL SERPL-MCNC: 89 MG/DL (ref 0–149)
TSH SERPL DL<=0.005 MIU/L-ACNC: 0.29 UIU/ML (ref 0.45–4.5)
WBC # BLD AUTO: 6.7 X10E3/UL (ref 3.4–10.8)

## 2024-01-16 ENCOUNTER — HOSPITAL ENCOUNTER (OUTPATIENT)
Dept: ULTRASOUND IMAGING | Facility: HOSPITAL | Age: 64
Discharge: HOME | End: 2024-01-16
Attending: STUDENT IN AN ORGANIZED HEALTH CARE EDUCATION/TRAINING PROGRAM
Payer: MEDICARE

## 2024-01-16 ENCOUNTER — HOSPITAL ENCOUNTER (OUTPATIENT)
Dept: CT IMAGING | Facility: HOSPITAL | Age: 64
Discharge: HOME | End: 2024-01-16
Attending: STUDENT IN AN ORGANIZED HEALTH CARE EDUCATION/TRAINING PROGRAM
Payer: MEDICARE

## 2024-01-16 DIAGNOSIS — Z72.0 TOBACCO USE: ICD-10-CM

## 2024-01-16 DIAGNOSIS — Z87.891 PERSONAL HISTORY OF NICOTINE DEPENDENCE: ICD-10-CM

## 2024-01-16 PROCEDURE — 76706 US ABDL AORTA SCREEN AAA: CPT

## 2024-01-16 PROCEDURE — 71271 CT THORAX LUNG CANCER SCR C-: CPT

## 2024-03-20 ENCOUNTER — OFFICE VISIT (OUTPATIENT)
Dept: FAMILY MEDICINE CLINIC | Facility: HOSPITAL | Age: 64
End: 2024-03-20
Payer: MEDICARE

## 2024-03-20 VITALS
OXYGEN SATURATION: 93 % | HEIGHT: 70 IN | BODY MASS INDEX: 33.07 KG/M2 | WEIGHT: 231 LBS | SYSTOLIC BLOOD PRESSURE: 110 MMHG | DIASTOLIC BLOOD PRESSURE: 80 MMHG | HEART RATE: 90 BPM

## 2024-03-20 DIAGNOSIS — R73.01 IFG (IMPAIRED FASTING GLUCOSE): ICD-10-CM

## 2024-03-20 DIAGNOSIS — E66.9 OBESITY, CLASS I, BMI 30.0-34.9 (SEE ACTUAL BMI): Primary | ICD-10-CM

## 2024-03-20 DIAGNOSIS — Z72.0 TOBACCO USE: ICD-10-CM

## 2024-03-20 DIAGNOSIS — R94.6 ABNORMAL THYROID FUNCTION TEST: ICD-10-CM

## 2024-03-20 DIAGNOSIS — I10 PRIMARY HYPERTENSION: ICD-10-CM

## 2024-03-20 DIAGNOSIS — R79.89 LOW TSH LEVEL: ICD-10-CM

## 2024-03-20 PROBLEM — E66.811 OBESITY, CLASS I, BMI 30.0-34.9 (SEE ACTUAL BMI): Status: ACTIVE | Noted: 2023-06-07

## 2024-03-20 PROCEDURE — 99214 OFFICE O/P EST MOD 30 MIN: CPT | Performed by: STUDENT IN AN ORGANIZED HEALTH CARE EDUCATION/TRAINING PROGRAM

## 2024-03-20 PROCEDURE — G2211 COMPLEX E/M VISIT ADD ON: HCPCS | Performed by: STUDENT IN AN ORGANIZED HEALTH CARE EDUCATION/TRAINING PROGRAM

## 2024-03-20 NOTE — PROGRESS NOTES
[facility]   Maryam Diego DO    Assessment/Plan:      Diagnosis ICD-10-CM Associated Orders   1. Obesity, Class I, BMI 30.0-34.9 (see actual BMI)  E66.9       2. Primary hypertension  I10       3. Tobacco use  Z72.0       4. IFG (impaired fasting glucose)  R73.01       5. Low TSH level  R79.89 TSH, 3rd generation     T4, free     Thyroid Antibodies Panel      6. Abnormal thyroid function test  R94.6 TSH, 3rd generation     T4, free        Rpt thyroid levels & we will reach out with results. Jan TSH slightly low.   Weight did increase, but pt's BP looks great.   Return in about 8 months (around 11/20/2024) for Annual Medicare Wellness.  Patient may call or return to office with any questions or concerns.   _____________________________________________________________________  Subjective:     Patient ID: Rodrigo Puente is a 64 y.o. male.  HPI  Rodrigo Puente  Chief Complaint   Patient presents with   • Follow-up     Feeling well, not ill recently.   Much less smoking than before.     Not eating different, but not walking as much due to windy cold days.     BP low today, 15 lb weight loss. WILL HOLD AMLODIPINE 5 mg DAILY & CHECK AT NEXT VISIT     Labs in Jan. All look great except slightly low TSH.   A1c stable.     Ct lung - IMPRESSION:  1. Sub-4 mm pulmonary nodules.  2.   Lung-RADS2, benign appearance or behavior.  Continue annual screening with LDCT in 12 months.  3. Mild ascending aortic ectasia measuring 41 mm. Recommend annual follow-up.    AAA US - No evidence for abdominal aortic aneurysm. Note, the upper abdominal aorta was obscured due to bowel gas.     Wt Readings from Last 3 Encounters:   03/20/24 105 kg (231 lb)   12/20/23 93.4 kg (206 lb)   06/07/23 96.2 kg (212 lb)     Temp Readings from Last 3 Encounters:   05/09/23 98.2 °F (36.8 °C) (Temporal)   02/08/21 (!) 95.9 °F (35.5 °C)   11/13/20 97.8 °F (36.6 °C)     BP Readings from Last 3 Encounters:   03/20/24 110/80   12/20/23  "90/60   06/07/23 110/70     Pulse Readings from Last 3 Encounters:   03/20/24 90   12/20/23 72   06/07/23 74        The following portions of the patient's history were reviewed and updated as appropriate: allergies, current medications, past medical history, and problem list.    Review of Systems   Constitutional:  Negative for chills and fever.   Respiratory:  Negative for cough and shortness of breath.    Cardiovascular:  Negative for chest pain and palpitations.   Neurological:  Negative for dizziness, light-headedness and headaches.       Objective:      Vitals:    03/20/24 1347   BP: 110/80   Pulse: 90   SpO2: 93%      Physical Exam  Vitals and nursing note reviewed.   Constitutional:       General: He is not in acute distress.     Appearance: Normal appearance. He is not ill-appearing.   HENT:      Head: Normocephalic and atraumatic.   Eyes:      General: No scleral icterus.        Right eye: No discharge.         Left eye: No discharge.   Neck:      Comments: No thyroid nodules or thyromegaly.  Cardiovascular:      Rate and Rhythm: Normal rate and regular rhythm.      Pulses: Normal pulses.      Heart sounds: Normal heart sounds. No murmur heard.  Pulmonary:      Effort: Pulmonary effort is normal. No respiratory distress.      Breath sounds: Normal breath sounds. No stridor. No wheezing.   Musculoskeletal:      Cervical back: Normal range of motion and neck supple. No rigidity or tenderness.      Right lower leg: No edema.      Left lower leg: No edema.   Lymphadenopathy:      Cervical: No cervical adenopathy.   Neurological:      Mental Status: He is alert and oriented to person, place, and time.      Gait: Gait normal.   Psychiatric:         Mood and Affect: Mood normal.         Behavior: Behavior normal.         Thought Content: Thought content normal.         Judgment: Judgment normal.           Portions of the record may have been created with voice recognition software. Occasional wrong word or \"sound " "alike\" substitutions may have occurred due to the inherent limitations of voice recognition software. Please review the chart carefully and recognize, using context, where substitutions/typographical errors may have occurred.     "

## 2024-05-29 DIAGNOSIS — I10 ESSENTIAL HYPERTENSION: ICD-10-CM

## 2024-05-30 RX ORDER — LISINOPRIL 20 MG/1
20 TABLET ORAL DAILY
Qty: 90 TABLET | Refills: 1 | Status: SHIPPED | OUTPATIENT
Start: 2024-05-30

## 2024-05-30 RX ORDER — METOPROLOL SUCCINATE 100 MG/1
100 TABLET, EXTENDED RELEASE ORAL DAILY
Qty: 90 TABLET | Refills: 1 | Status: SHIPPED | OUTPATIENT
Start: 2024-05-30

## 2024-06-03 ENCOUNTER — TELEPHONE (OUTPATIENT)
Age: 64
End: 2024-06-03

## 2024-06-03 NOTE — TELEPHONE ENCOUNTER
Patient called to stated that over the weekend he was at a family gathering and was speaking to one of his nephews, who is a marine and in good physical shape, and was told that he(the nephew) has been diagnosed with  hemochromatosis. Patient stated that he himself is not having any symptoms of it but would like labs ordered for him to be tested for it since it is genetic. He stated that the rest of the family decided it would be a good idea to be tested as well.       Please advise and let patient know. He is also requesting that if the labs are ordered to please mail him the order so that he can take it to [facility] to have completed.

## 2024-07-29 DIAGNOSIS — Z83.49 FAMILY HISTORY OF HEMOCHROMATOSIS: Primary | ICD-10-CM

## 2024-11-22 DIAGNOSIS — Z83.49 FAMILY HISTORY OF HEMOCHROMATOSIS: Primary | ICD-10-CM

## 2024-12-08 DIAGNOSIS — I10 ESSENTIAL HYPERTENSION: ICD-10-CM

## 2024-12-10 DIAGNOSIS — I10 ESSENTIAL HYPERTENSION: ICD-10-CM

## 2024-12-10 RX ORDER — LISINOPRIL 20 MG/1
20 TABLET ORAL DAILY
Qty: 90 TABLET | Refills: 0 | Status: SHIPPED | OUTPATIENT
Start: 2024-12-10

## 2024-12-10 RX ORDER — METOPROLOL SUCCINATE 100 MG/1
100 TABLET, EXTENDED RELEASE ORAL DAILY
Qty: 90 TABLET | Refills: 0 | Status: SHIPPED | OUTPATIENT
Start: 2024-12-10

## 2024-12-11 RX ORDER — LISINOPRIL 20 MG/1
20 TABLET ORAL DAILY
Qty: 90 TABLET | Refills: 1 | OUTPATIENT
Start: 2024-12-11

## 2024-12-11 RX ORDER — METOPROLOL SUCCINATE 100 MG/1
100 TABLET, EXTENDED RELEASE ORAL DAILY
Qty: 90 TABLET | Refills: 1 | OUTPATIENT
Start: 2024-12-11

## 2024-12-17 LAB
T4 FREE SERPL-MCNC: 1.22 NG/DL (ref 0.82–1.77)
THYROGLOB AB SERPL-ACNC: <1 IU/ML (ref 0–0.9)
THYROPEROXIDASE AB SERPL-ACNC: <9 IU/ML (ref 0–34)
TSH SERPL DL<=0.005 MIU/L-ACNC: 1.39 UIU/ML (ref 0.45–4.5)

## 2024-12-19 ENCOUNTER — RA CDI HCC (OUTPATIENT)
Dept: OTHER | Facility: HOSPITAL | Age: 64
End: 2024-12-19

## 2024-12-27 ENCOUNTER — OFFICE VISIT (OUTPATIENT)
Dept: FAMILY MEDICINE CLINIC | Facility: HOSPITAL | Age: 64
End: 2024-12-27
Payer: COMMERCIAL

## 2024-12-27 VITALS
DIASTOLIC BLOOD PRESSURE: 78 MMHG | OXYGEN SATURATION: 96 % | HEART RATE: 79 BPM | SYSTOLIC BLOOD PRESSURE: 120 MMHG | BODY MASS INDEX: 29.26 KG/M2 | WEIGHT: 204.4 LBS | HEIGHT: 70 IN

## 2024-12-27 DIAGNOSIS — Z00.00 MEDICARE ANNUAL WELLNESS VISIT, INITIAL: Primary | ICD-10-CM

## 2024-12-27 DIAGNOSIS — Z84.89 FAMILY HISTORY OF STENT: ICD-10-CM

## 2024-12-27 DIAGNOSIS — R01.1 MURMUR, CARDIAC: ICD-10-CM

## 2024-12-27 DIAGNOSIS — Z87.891 PERSONAL HISTORY OF NICOTINE DEPENDENCE: ICD-10-CM

## 2024-12-27 DIAGNOSIS — I10 PRIMARY HYPERTENSION: ICD-10-CM

## 2024-12-27 DIAGNOSIS — Z13.220 SCREENING FOR HYPERLIPIDEMIA: ICD-10-CM

## 2024-12-27 DIAGNOSIS — Z23 ENCOUNTER FOR IMMUNIZATION: ICD-10-CM

## 2024-12-27 DIAGNOSIS — R73.01 IFG (IMPAIRED FASTING GLUCOSE): ICD-10-CM

## 2024-12-27 DIAGNOSIS — Z72.0 TOBACCO USE: ICD-10-CM

## 2024-12-27 DIAGNOSIS — Z12.5 SCREENING FOR PROSTATE CANCER: ICD-10-CM

## 2024-12-27 DIAGNOSIS — Z13.0 SCREENING FOR IRON DEFICIENCY ANEMIA: ICD-10-CM

## 2024-12-27 DIAGNOSIS — I25.10 ATHEROSCLEROSIS OF NATIVE CORONARY ARTERY OF NATIVE HEART WITHOUT ANGINA PECTORIS: ICD-10-CM

## 2024-12-27 PROCEDURE — 99214 OFFICE O/P EST MOD 30 MIN: CPT | Performed by: STUDENT IN AN ORGANIZED HEALTH CARE EDUCATION/TRAINING PROGRAM

## 2024-12-27 PROCEDURE — G0008 ADMIN INFLUENZA VIRUS VAC: HCPCS

## 2024-12-27 PROCEDURE — G0438 PPPS, INITIAL VISIT: HCPCS | Performed by: STUDENT IN AN ORGANIZED HEALTH CARE EDUCATION/TRAINING PROGRAM

## 2024-12-27 PROCEDURE — 90662 IIV NO PRSV INCREASED AG IM: CPT

## 2024-12-27 NOTE — PROGRESS NOTES
Name: Rodrigo Puente      : 1960      MRN: [id number]  Encounter Provider: Maryam Diego DO  Encounter Date: 2024   Encounter department: [facility] PRIMARY CARE [address]    Assessment & Plan  Medicare annual wellness visit, initial  Topics as below.        Encounter for immunization  Orders:  •  influenza vaccine, high-dose, PF 0.5 mL (Fluzone High Dose)    Primary hypertension    Orders:  •  Comprehensive metabolic panel; Future    Tobacco use  Cut back already, declined meds.   Orders:  •  CT lung screening program; Future    IFG (impaired fasting glucose)    Orders:  •  Comprehensive metabolic panel; Future  •  Hemoglobin A1C; Future    Screening for prostate cancer    Orders:  •  PSA Total (Reflex To Free); Future    Screening for iron deficiency anemia    Orders:  •  CBC and differential; Future    Screening for hyperlipidemia    Orders:  •  Lipid Panel with Direct LDL reflex; Future    Personal history of nicotine dependence    Orders:  •  CT lung screening program; Future    Family history of stent    Orders:  •  High sensitivity CRP; Future    Atherosclerosis of native coronary artery of native heart without angina pectoris  CoQ10, Vitamin K2, and Magnesium, Red yeast rice, Garlic supplements.   Statin - Lipitor or crestor, would need to start at next visit after labs & imaging.   Orders:  •  High sensitivity CRP; Future    Murmur, cardiac  Soft, 2/6     Return in about 3 months (around 3/27/2025) for F/U Chronic DX, DISCUSS STATIN & LUNG CT Calcium burden.      Depression Screening and Follow-up Plan: Patient was screened for depression during today's encounter. They screened negative with a PHQ-2 score of 0.    Tobacco Cessation Counseling: Tobacco cessation counseling was provided. The patient is sincerely urged to quit consumption of tobacco. He is not ready to quit tobacco. Medication options discussed. Patient refused medication. Cutting down signif on smoking, much  lower now      Preventive health issues were discussed with patient, and age appropriate screening tests were ordered as noted in patient's After Visit Summary. Personalized health advice and appropriate referrals for health education or preventive services given if needed, as noted in patient's After Visit Summary.    History of Present Illness     HPI   Patient Care Team:  Maryam Diego DO as PCP - General (Family Medicine)  Moe Rapp MD as PCP - PCP-[facility] ([facility])  Evelia Villa DO as PCP - PCP-[facility] ([facility])  Jonathan Smith DO (Gastroenterology)    Wt Readings from Last 3 Encounters:   12/27/24 92.7 kg (204 lb 6.4 oz)   03/20/24 96.6 kg (213 lb)   12/20/23 93.4 kg (206 lb)     Temp Readings from Last 3 Encounters:   05/09/23 98.2 °F (36.8 °C) (Temporal)   02/08/21 (!) 95.9 °F (35.5 °C)   11/13/20 97.8 °F (36.6 °C)     BP Readings from Last 3 Encounters:   12/27/24 120/78   03/20/24 110/80   12/20/23 90/60     Pulse Readings from Last 3 Encounters:   12/27/24 79   03/20/24 90   12/20/23 72     Review of Systems   Constitutional:  Negative for chills, fatigue and fever.   Respiratory:  Negative for cough and shortness of breath (better since cutting down the ciggs).    Cardiovascular:  Negative for chest pain and palpitations.   Gastrointestinal:  Negative for constipation and diarrhea.   Genitourinary:  Negative for dysuria and frequency.   Neurological:  Negative for dizziness, light-headedness and headaches.     Medical History Reviewed by provider this encounter:  Meds  Problems       Annual Wellness Visit Questionnaire   Rodrigo is here for his Initial Wellness visit.     Health Risk Assessment:   Patient rates overall health as good. Patient feels that their physical health rating is same. Patient is satisfied with their life. Eyesight was rated as same. Hearing was rated as same. Patient feels that their emotional and mental health rating is same. Patients states  they are never, rarely angry. Patient states they are never, rarely unusually tired/fatigued. Pain experienced in the last 7 days has been none. Patient states that he has experienced no weight loss or gain in last 6 months. Not ill recently.     Depression Screening:   PHQ-2 Score: 0      Fall Risk Screening:   In the past year, patient has experienced: no history of falling in past year      Home Safety:  Patient does not have trouble with stairs inside or outside of their home. Patient has working smoke alarms and has working carbon monoxide detector. Home safety hazards include: none and not having non-slip bath and/or shower mats. Home safety hazards include: none and not having non-slip bath and/or shower mats.     Nutrition:   Current diet is Regular, No Added Salt and Limited junk food.     Medications:   Patient is not currently taking any over-the-counter supplements. Patient is able to manage medications.     Activities of Daily Living (ADLs)/Instrumental Activities of Daily Living (IADLs):   Walk and transfer into and out of bed and chair?: Yes  Dress and groom yourself?: Yes    Bathe or shower yourself?: Yes    Feed yourself? Yes  Do your laundry/housekeeping?: Yes  Manage your money, pay your bills and track your expenses?: Yes  Make your own meals?: Yes    Do your own shopping?: Yes    Durable Medical Equipment Suppliers  None    Previous Hospitalizations:   Any hospitalizations or ED visits within the last 12 months?: No      Advance Care Planning:   Living will: Yes    Durable POA for healthcare: Yes    Advanced directive: Yes      Cognitive Screening:   Provider or family/friend/caregiver concerned regarding cognition?: No    PREVENTIVE SCREENINGS      Cardiovascular Screening:    General: Screening Current    Due for: Lipid Panel      Diabetes Screening:     General: Screening Current    Due for: Blood Glucose      Colorectal Cancer Screening:     General: Screening Current      Prostate Cancer  Screening:    General: Screening Current    Due for: PSA      Osteoporosis Screening:    General: Screening Not Indicated      Abdominal Aortic Aneurysm (AAA) Screening:    Risk factors include: age between 65-74 yo and tobacco use        General: Screening Current      Lung Cancer Screening:     General: Screening Not Indicated      Hepatitis C Screening:    General: Screening Current    Screening, Brief Intervention, and Referral to Treatment (SBIRT)    Screening  Typical number of drinks in a day: 2  Typical number of drinks in a week: 14  Interpretation: Low risk drinking behavior.    AUDIT-C Screenin) How often did you have a drink containing alcohol in the past year? 4 or more times a week  2) How many drinks did you have on a typical day when you were drinking in the past year? 1 to 2  3) How often did you have 6 or more drinks on one occasion in the past year? never    AUDIT-C Score: 4  Interpretation: Score 4-12 (male): POSITIVE screen for alcohol misuse    AUDIT Screenin) How often during the last year have you found that you were not able to stop drinking once you had started? 0 - never  5) How often during the last year have you failed to do what was normally expected from you because of drinking? 0 - never  6) How often during the last year have you needed a first drink in the morning to get yourself going after a heavy drinking session? 0 - never  7) How often during the last year have you had a feeling of guilt or remorse after drinking? 0 - never  8) How often during the last year have you been unable to remember what happened the night before because you had been drinking? 0 - never  9) Have you or someone else been injured as a result of your drinking? 0 - no  10) Has a relative or friend or a doctor or another health worker been concerned about your drinking or suggested you cut down? 2 - yes, but not in the last year    AUDIT Score: 6  Interpretation: Low risk alcohol  "consumption    Single Item Drug Screening:  How often have you used an illegal drug (including marijuana) or a prescription medication for non-medical reasons in the past year? never    Single Item Drug Screen Score: 0  Interpretation: Negative screen for possible drug use disorder    Other Counseling Topics:   Car/seat belt/driving safety, skin self-exam, sunscreen and calcium and vitamin D intake and regular weightbearing exercise.     Social Drivers of Health     Financial Resource Strain: Low Risk  (12/20/2023)    Overall Financial Resource Strain (CARDIA)    • Difficulty of Paying Living Expenses: Not hard at all   Food Insecurity: No Food Insecurity (12/27/2024)    Hunger Vital Sign    • Worried About Running Out of Food in the Last Year: Never true    • Ran Out of Food in the Last Year: Never true   Transportation Needs: No Transportation Needs (12/27/2024)    PRAPARE - Transportation    • Lack of Transportation (Medical): No    • Lack of Transportation (Non-Medical): No   Housing Stability: Low Risk  (12/27/2024)    Housing Stability Vital Sign    • Unable to Pay for Housing in the Last Year: No    • Number of Times Moved in the Last Year: 0    • Homeless in the Last Year: No   Utilities: Not At Risk (12/27/2024)    [facility] Utilities    • Threatened with loss of utilities: No     Objective   /78   Pulse 79   Ht 5' 9.75\" (1.772 m)   Wt 92.7 kg (204 lb 6.4 oz)   SpO2 96%   BMI 29.54 kg/m²     Physical Exam  Vitals reviewed.   Constitutional:       General: He is not in acute distress.     Appearance: Normal appearance. He is well-developed, well-groomed and overweight. He is not ill-appearing.   HENT:      Head: Normocephalic and atraumatic.   Eyes:      General: No scleral icterus.        Right eye: No discharge.         Left eye: No discharge.   Neck:      Vascular: No carotid bruit.   Cardiovascular:      Rate and Rhythm: Normal rate and regular rhythm.      Pulses: Normal pulses.      Heart sounds: " Murmur (2/6 mild, L sternal border) heard.   Pulmonary:      Effort: Pulmonary effort is normal. No respiratory distress.      Breath sounds: Normal breath sounds. No wheezing.   Musculoskeletal:      Cervical back: Normal range of motion and neck supple.      Right lower leg: No edema.      Left lower leg: No edema.   Neurological:      Mental Status: He is alert and oriented to person, place, and time.      Gait: Gait normal.   Psychiatric:         Mood and Affect: Mood normal.         Behavior: Behavior normal. Behavior is cooperative.         Thought Content: Thought content normal.         Judgment: Judgment normal.

## 2024-12-27 NOTE — PATIENT INSTRUCTIONS
CoQ10, Vitamin K2, and Magnesium, Red yeast rice, Garlic supplements.   Statin - Lipitor or crestor, would need to start at next visit after labs & imaging.   Stent or Bypass  Medicare Preventive Visit Patient Instructions  Thank you for completing your Welcome to Medicare Visit or Medicare Annual Wellness Visit today. Your next wellness visit will be due in one year (12/28/2025).  The screening/preventive services that you may require over the next 5-10 years are detailed below. Some tests may not apply to you based off risk factors and/or age. Screening tests ordered at today's visit but not completed yet may show as past due. Also, please note that scanned in results may not display below.  Preventive Screenings:  Service Recommendations Previous Testing/Comments   Colorectal Cancer Screening  Colonoscopy    Fecal Occult Blood Test (FOBT)/Fecal Immunochemical Test (FIT)  Fecal DNA/Cologuard Test  Flexible Sigmoidoscopy Age: 45-75 years old   Colonoscopy: every 10 years (May be performed more frequently if at higher risk)  OR  FOBT/FIT: every 1 year  OR  Cologuard: every 3 years  OR  Sigmoidoscopy: every 5 years  Screening may be recommended earlier than age 45 if at higher risk for colorectal cancer. Also, an individualized decision between you and your healthcare provider will decide whether screening between the ages of 76-85 would be appropriate. Colonoscopy: 05/09/2023  FOBT/FIT: Not on file  Cologuard: 04/05/2023  Sigmoidoscopy: Not on file    Screening Current     Prostate Cancer Screening Individualized decision between patient and health care provider in men between ages of 55-69   Medicare will cover every 12 months beginning on the day after your 50th birthday PSA: 3.2 ng/mL     Screening Current     Hepatitis C Screening Once for adults born between 1945 and 1965  More frequently in patients at high risk for Hepatitis C Hep C Antibody: 11/07/2019    Screening Current   Diabetes Screening 1-2 times per  year if you're at risk for diabetes or have pre-diabetes Fasting glucose: No results in last 5 years (No results in last 5 years)  A1C: 5.5 % (1/9/2024)  Screening Current   Cholesterol Screening Once every 5 years if you don't have a lipid disorder. May order more often based on risk factors. Lipid panel: 01/09/2024  Screening Current      Other Preventive Screenings Covered by Medicare:  Abdominal Aortic Aneurysm (AAA) Screening: covered once if your at risk. You're considered to be at risk if you have a family history of AAA or a male between the age of 65-75 who smoking at least 100 cigarettes in your lifetime.  Lung Cancer Screening: covers low dose CT scan once per year if you meet all of the following conditions: (1) Age 55-77; (2) No signs or symptoms of lung cancer; (3) Current smoker or have quit smoking within the last 15 years; (4) You have a tobacco smoking history of at least 20 pack years (packs per day x number of years you smoked); (5) You get a written order from a healthcare provider.  Glaucoma Screening: covered annually if you're considered high risk: (1) You have diabetes OR (2) Family history of glaucoma OR (3)  aged 50 and older OR (4)  American aged 65 and older  Osteoporosis Screening: covered every 2 years if you meet one of the following conditions: (1) Have a vertebral abnormality; (2) On glucocorticoid therapy for more than 3 months; (3) Have primary hyperparathyroidism; (4) On osteoporosis medications and need to assess response to drug therapy.  HIV Screening: covered annually if you're between the age of 15-65. Also covered annually if you are younger than 15 and older than 65 with risk factors for HIV infection. For pregnant patients, it is covered up to 3 times per pregnancy.    Immunizations:  Immunization Recommendations   Influenza Vaccine Annual influenza vaccination during flu season is recommended for all persons aged >= 6 months who do not have  contraindications   Pneumococcal Vaccine   * Pneumococcal conjugate vaccine = PCV13 (Prevnar 13), PCV15 (Vaxneuvance), PCV20 (Prevnar 20)  * Pneumococcal polysaccharide vaccine = PPSV23 (Pneumovax) Adults 19-63 yo with certain risk factors or if 65+ yo  If never received any pneumonia vaccine: recommend Prevnar 20 (PCV20)  Give PCV20 if previously received 1 dose of PCV13 or PPSV23   Hepatitis B Vaccine 3 dose series if at intermediate or high risk (ex: diabetes, end stage renal disease, liver disease)   Respiratory syncytial virus (RSV) Vaccine - COVERED BY MEDICARE PART D  * RSVPreF3 (Arexvy) CDC recommends that adults 60 years of age and older may receive a single dose of RSV vaccine using shared clinical decision-making (SCDM)   Tetanus (Td) Vaccine - COST NOT COVERED BY MEDICARE PART B Following completion of primary series, a booster dose should be given every 10 years to maintain immunity against tetanus. Td may also be given as tetanus wound prophylaxis.   Tdap Vaccine - COST NOT COVERED BY MEDICARE PART B Recommended at least once for all adults. For pregnant patients, recommended with each pregnancy.   Shingles Vaccine (Shingrix) - COST NOT COVERED BY MEDICARE PART B  2 shot series recommended in those 19 years and older who have or will have weakened immune systems or those 50 years and older     Health Maintenance Due:      Topic Date Due    Colorectal Cancer Screening  05/08/2026    Hepatitis C Screening  Completed     Immunizations Due:      Topic Date Due    Pneumococcal Vaccine: 65+ Years (1 of 2 - PCV) Never done    COVID-19 Vaccine (3 - 2024-25 season) 09/01/2024     Advance Directives   What are advance directives?  Advance directives are legal documents that state your wishes and plans for medical care. These plans are made ahead of time in case you lose your ability to make decisions for yourself. Advance directives can apply to any medical decision, such as the treatments you want, and if you  want to donate organs.   What are the types of advance directives?  There are many types of advance directives, and each state has rules about how to use them. You may choose a combination of any of the following:  Living will:  This is a written record of the treatment you want. You can also choose which treatments you do not want, which to limit, and which to stop at a certain time. This includes surgery, medicine, IV fluid, and tube feedings.   Durable power of  for healthcare (DPAHC):  This is a written record that states who you want to make healthcare choices for you when you are unable to make them for yourself. This person, called a proxy, is usually a family member or a friend. You may choose more than 1 proxy.  Do not resuscitate (DNR) order:  A DNR order is used in case your heart stops beating or you stop breathing. It is a request not to have certain forms of treatment, such as CPR. A DNR order may be included in other types of advance directives.  Medical directive:  This covers the care that you want if you are in a coma, near death, or unable to make decisions for yourself. You can list the treatments you want for each condition. Treatment may include pain medicine, surgery, blood transfusions, dialysis, IV or tube feedings, and a ventilator (breathing machine).  Values history:  This document has questions about your views, beliefs, and how you feel and think about life. This information can help others choose the care that you would choose.  Why are advance directives important?  An advance directive helps you control your care. Although spoken wishes may be used, it is better to have your wishes written down. Spoken wishes can be misunderstood, or not followed. Treatments may be given even if you do not want them. An advance directive may make it easier for your family to make difficult choices about your care.   Cigarette Smoking and Your Health   Risks to your health if you smoke:   Nicotine and other chemicals found in tobacco damage every cell in your body. Even if you are a light smoker, you have an increased risk for cancer, heart disease, and lung disease. If you are pregnant or have diabetes, smoking increases your risk for complications.   Benefits to your health if you stop smoking:   You decrease respiratory symptoms such as coughing, wheezing, and shortness of breath.   You reduce your risk for cancers of the lung, mouth, throat, kidney, bladder, pancreas, stomach, and cervix. If you already have cancer, you increase the benefits of chemotherapy. You also reduce your risk for cancer returning or a second cancer from developing.   You reduce your risk for heart disease, blood clots, heart attack, and stroke.   You reduce your risk for lung infections, and diseases such as pneumonia, asthma, chronic bronchitis, and emphysema.  Your circulation improves. More oxygen can be delivered to your body. If you have diabetes, you lower your risk for complications, such as kidney, artery, and eye diseases. You also lower your risk for nerve damage. Nerve damage can lead to amputations, poor vision, and blindness.  You improve your body's ability to heal and to fight infections.  For more information and support to stop smoking:   [email or web address]  Phone: [phone] - [phone]  Web Address: [email or web address] assistant  Weight Management   Why it is important to manage your weight:  Being overweight increases your risk of health conditions such as heart disease, high blood pressure, type 2 diabetes, and certain types of cancer. It can also increase your risk for osteoarthritis, sleep apnea, and other respiratory problems. Aim for a slow, steady weight loss. Even a small amount of weight loss can lower your risk of health problems.  How to lose weight safely:  A safe and healthy way to lose weight is to eat fewer calories and get regular exercise. You can lose up about 1 pound a week by decreasing the number of  calories you eat by 500 calories each day.   Healthy meal plan for weight management:  A healthy meal plan includes a variety of foods, contains fewer calories, and helps you stay healthy. A healthy meal plan includes the following:  Eat whole-grain foods more often.  A healthy meal plan should contain fiber. Fiber is the part of grains, fruits, and vegetables that is not broken down by your body. Whole-grain foods are healthy and provide extra fiber in your diet. Some examples of whole-grain foods are whole-wheat breads and pastas, oatmeal, brown rice, and bulgur.  Eat a variety of vegetables every day.  Include dark, leafy greens such as spinach, kale, vikki greens, and mustard greens. Eat yellow and orange vegetables such as carrots, sweet potatoes, and winter squash.   Eat a variety of fruits every day.  Choose fresh or canned fruit (canned in its own juice or light syrup) instead of juice. Fruit juice has very little or no fiber.  Eat low-fat dairy foods.  Drink fat-free (skim) milk or 1% milk. Eat fat-free yogurt and low-fat cottage cheese. Try low-fat cheeses such as mozzarella and other reduced-fat cheeses.  Choose meat and other protein foods that are low in fat.  Choose beans or other legumes such as split peas or lentils. Choose fish, skinless poultry (chicken or turkey), or lean cuts of red meat (beef or pork). Before you cook meat or poultry, cut off any visible fat.   Use less fat and oil.  Try baking foods instead of frying them. Add less fat, such as margarine, sour cream, regular salad dressing and mayonnaise to foods. Eat fewer high-fat foods. Some examples of high-fat foods include french fries, doughnuts, ice cream, and cakes.  Eat fewer sweets.  Limit foods and drinks that are high in sugar. This includes candy, cookies, regular soda, and sweetened drinks.  Exercise:  Exercise at least 30 minutes per day on most days of the week. Some examples of exercise include walking, biking, dancing, and  "swimming. You can also fit in more physical activity by taking the stairs instead of the elevator or parking farther away from stores. Ask your healthcare provider about the best exercise plan for you.   Alcohol Use and Your Health    Drinking too much can harm your health.  Excessive alcohol use leads to about 88,000 death in the [place] each year, and shortens the life of those who diet by almost 30 years.  Further, excessive drinking cost the economy $249 billion in 2010.  Most excessive drinkers are not alcohol dependent.    Excessive alcohol use has immediate effects that increase the risk of many harmful health conditions.  These are most often the result of binge drinking.  Over time, excessive alcohol use can lead to the development of chronic diseases and other series health problems.    What is considered a \"drink\"?        Excessive alcohol use includes:  Binge Drinking: For women, 4 or more drinks consumed on one occasion. For men, 5 or more drinks consumed on one occasion.  Heavy Drinking: For women, 8 or more drinks per week. For men, 15 or more drinks per week  Any alcohol used by pregnant women  Any alcohol used by those under the age of 21 years    If you choose to drink, do so in moderation:  Do not drink at all if you are under the age of 21, or if you are or may be pregnant, or have health problems that could be made worse by drinking.  For women, up to 1 drink per day  For men, up to 2 drinks a day    No one should begin drinking or drink more frequently based on potential health benefits    Short-Term Health Risks:  Injuries: motor vehicle crashes, falls, drownings, burns  Violence: homicide, suicide, sexual assault, intimate partner violence  Alcohol poisoning  Reproductive health: risky sexual behaviors, unintended prengnacy, sexually transmitted diseases, miscarriage, stillbirth, fetal alcohol syndrome    Long-Term Health Risks:  Chronic diseases: high blood pressure, heart disease, " stroke, liver disease, digestive problems  Cancers: breast, mouth and throat, liver, colon  Learning and memory problems: dementia, poor school performance  Mental health: depression, anxiety, insomnia  Social problems: lost productivity, family problems, unemployment  Alcohol dependence    For support and more information:  Substance Abuse and Mental Health Services Administration  [address]  [place] [address]  Web Address: http://www.[facility].gov    Alcoholics Anonymous        Web Address: http://www.aa.org    https://www.cdc.gov/alcohol/fact-sheets/alcohol-use.htm     © Copyright [employer] 2018 Information is for End User's use only and may not be sold, redistributed or otherwise used for commercial purposes. All illustrations and images included in CareNotes® are the copyrighted property of [employer] or [employer]

## 2025-01-16 ENCOUNTER — HOSPITAL ENCOUNTER (OUTPATIENT)
Dept: CT IMAGING | Facility: HOSPITAL | Age: 65
Discharge: HOME | End: 2025-01-16
Attending: STUDENT IN AN ORGANIZED HEALTH CARE EDUCATION/TRAINING PROGRAM

## 2025-01-16 DIAGNOSIS — Z87.891 PERSONAL HISTORY OF NICOTINE DEPENDENCE: ICD-10-CM

## 2025-01-16 DIAGNOSIS — Z72.0 TOBACCO USE: ICD-10-CM

## 2025-01-22 ENCOUNTER — RESULTS FOLLOW-UP (OUTPATIENT)
Dept: FAMILY MEDICINE CLINIC | Facility: HOSPITAL | Age: 65
End: 2025-01-22

## 2025-03-09 DIAGNOSIS — I10 ESSENTIAL HYPERTENSION: ICD-10-CM

## 2025-03-10 RX ORDER — LISINOPRIL 20 MG/1
20 TABLET ORAL DAILY
Qty: 30 TABLET | Refills: 0 | Status: SHIPPED | OUTPATIENT
Start: 2025-03-10

## 2025-03-10 RX ORDER — METOPROLOL SUCCINATE 100 MG/1
100 TABLET, EXTENDED RELEASE ORAL DAILY
Qty: 90 TABLET | Refills: 1 | Status: SHIPPED | OUTPATIENT
Start: 2025-03-10

## 2025-03-22 LAB — CRP SERPL HS-MCNC: 5.08 MG/L (ref 0–3)

## 2025-03-24 LAB
ALBUMIN SERPL-MCNC: 4.4 G/DL (ref 3.9–4.9)
ALP SERPL-CCNC: 108 IU/L (ref 44–121)
ALT SERPL-CCNC: 10 IU/L (ref 0–44)
AST SERPL-CCNC: 14 IU/L (ref 0–40)
BASOPHILS # BLD AUTO: 0.1 X10E3/UL (ref 0–0.2)
BASOPHILS NFR BLD AUTO: 1 %
BILIRUB SERPL-MCNC: 0.4 MG/DL (ref 0–1.2)
BUN SERPL-MCNC: 20 MG/DL (ref 8–27)
BUN/CREAT SERPL: 26 (ref 10–24)
CALCIUM SERPL-MCNC: 10 MG/DL (ref 8.6–10.2)
CHLORIDE SERPL-SCNC: 99 MMOL/L (ref 96–106)
CHOLEST SERPL-MCNC: 176 MG/DL (ref 100–199)
CO2 SERPL-SCNC: 21 MMOL/L (ref 20–29)
CREAT SERPL-MCNC: 0.78 MG/DL (ref 0.76–1.27)
EGFR: 98 ML/MIN/1.73
EOSINOPHIL # BLD AUTO: 0.2 X10E3/UL (ref 0–0.4)
EOSINOPHIL NFR BLD AUTO: 3 %
ERYTHROCYTE [DISTWIDTH] IN BLOOD BY AUTOMATED COUNT: 12.6 % (ref 11.6–15.4)
EST. AVERAGE GLUCOSE BLD GHB EST-MCNC: 114 MG/DL
GLOBULIN SER-MCNC: 2.7 G/DL (ref 1.5–4.5)
GLUCOSE SERPL-MCNC: 109 MG/DL (ref 70–99)
HBA1C MFR BLD: 5.6 % (ref 4.8–5.6)
HCT VFR BLD AUTO: 40.7 % (ref 37.5–51)
HDLC SERPL-MCNC: 54 MG/DL
HGB BLD-MCNC: 14 G/DL (ref 13–17.7)
IMM GRANULOCYTES # BLD: 0.1 X10E3/UL (ref 0–0.1)
IMM GRANULOCYTES NFR BLD: 1 %
LDLC SERPL CALC-MCNC: 98 MG/DL (ref 0–99)
LDLC/HDLC SERPL: 1.8 RATIO (ref 0–3.6)
LYMPHOCYTES # BLD AUTO: 1.7 X10E3/UL (ref 0.7–3.1)
LYMPHOCYTES NFR BLD AUTO: 23 %
MCH RBC QN AUTO: 34.1 PG (ref 26.6–33)
MCHC RBC AUTO-ENTMCNC: 34.4 G/DL (ref 31.5–35.7)
MCV RBC AUTO: 99 FL (ref 79–97)
MONOCYTES # BLD AUTO: 0.9 X10E3/UL (ref 0.1–0.9)
MONOCYTES NFR BLD AUTO: 12 %
NEUTROPHILS # BLD AUTO: 4.5 X10E3/UL (ref 1.4–7)
NEUTROPHILS NFR BLD AUTO: 60 %
PLATELET # BLD AUTO: 212 X10E3/UL (ref 150–450)
POTASSIUM SERPL-SCNC: 4.7 MMOL/L (ref 3.5–5.2)
PROT SERPL-MCNC: 7.1 G/DL (ref 6–8.5)
PSA SERPL-MCNC: 2.1 NG/ML (ref 0–4)
RBC # BLD AUTO: 4.1 X10E6/UL (ref 4.14–5.8)
SL AMB REFLEX CRITERIA: NORMAL
SL AMB VLDL CHOLESTEROL CALC: 24 MG/DL (ref 5–40)
SODIUM SERPL-SCNC: 138 MMOL/L (ref 134–144)
TRIGL SERPL-MCNC: 138 MG/DL (ref 0–149)
WBC # BLD AUTO: 7.4 X10E3/UL (ref 3.4–10.8)

## 2025-03-31 ENCOUNTER — OFFICE VISIT (OUTPATIENT)
Dept: FAMILY MEDICINE CLINIC | Facility: HOSPITAL | Age: 65
End: 2025-03-31
Payer: COMMERCIAL

## 2025-03-31 VITALS
BODY MASS INDEX: 28.55 KG/M2 | DIASTOLIC BLOOD PRESSURE: 80 MMHG | HEART RATE: 77 BPM | SYSTOLIC BLOOD PRESSURE: 110 MMHG | HEIGHT: 70 IN | OXYGEN SATURATION: 95 % | WEIGHT: 199.4 LBS

## 2025-03-31 DIAGNOSIS — R79.82 ELEVATED C-REACTIVE PROTEIN (CRP): ICD-10-CM

## 2025-03-31 DIAGNOSIS — Z72.0 TOBACCO USE: ICD-10-CM

## 2025-03-31 DIAGNOSIS — I10 ESSENTIAL HYPERTENSION: Primary | ICD-10-CM

## 2025-03-31 DIAGNOSIS — I25.10 CORONARY ARTERY CALCIFICATION SEEN ON CAT SCAN: ICD-10-CM

## 2025-03-31 PROCEDURE — 99214 OFFICE O/P EST MOD 30 MIN: CPT | Performed by: STUDENT IN AN ORGANIZED HEALTH CARE EDUCATION/TRAINING PROGRAM

## 2025-03-31 PROCEDURE — G2211 COMPLEX E/M VISIT ADD ON: HCPCS | Performed by: STUDENT IN AN ORGANIZED HEALTH CARE EDUCATION/TRAINING PROGRAM

## 2025-03-31 RX ORDER — ROSUVASTATIN CALCIUM 10 MG/1
TABLET, COATED ORAL
Qty: 90 TABLET | Refills: 0 | Status: SHIPPED | OUTPATIENT
Start: 2025-03-31 | End: 2025-06-29

## 2025-03-31 NOTE — ASSESSMENT & PLAN NOTE
Start crestor. SE's reviewed. Handout on AVS.   Will titrate up, may ultimately need more than 10 mg.   Can be adjusted by cardio.   Orders:  •  rosuvastatin (CRESTOR) 10 MG tablet; Take 0.5 tablets (5 mg total) by mouth daily for 14 days, THEN 1 tablet (10 mg total) daily.  •  Ambulatory Referral to Cardiology; Future  •  VAS carotid complete study; Future

## 2025-03-31 NOTE — PROGRESS NOTES
Name: Rodrigo Puente      : 1960      MRN: [id number]  Encounter Provider: Maryam Diego DO  Encounter Date: 3/31/2025   Encounter department: [facility] [address]  :  Assessment & Plan  Essential hypertension  Well controlled on current meds for BP.   BP Readings from Last 3 Encounters:   25 110/80   24 120/78   24 110/80     Orders:  •  Ambulatory Referral to Cardiology; Future    Coronary artery calcification seen on CAT scan  Start crestor. SE's reviewed. Handout on AVS.   Will titrate up, may ultimately need more than 10 mg.   Can be adjusted by cardio.   Orders:  •  rosuvastatin (CRESTOR) 10 MG tablet; Take 0.5 tablets (5 mg total) by mouth daily for 14 days, THEN 1 tablet (10 mg total) daily.  •  Ambulatory Referral to Cardiology; Future  •  VAS carotid complete study; Future    Elevated C-reactive protein (CRP)    Orders:  •  rosuvastatin (CRESTOR) 10 MG tablet; Take 0.5 tablets (5 mg total) by mouth daily for 14 days, THEN 1 tablet (10 mg total) daily.  •  Ambulatory Referral to Cardiology; Future    Tobacco use  Cutting back down, working to zero for smoking.        Return if symptoms worsen or fail to improve.      Depression Screening and Follow-up Plan:   Clincally patient does not have depression. No treatment is required.     Tobacco Cessation Counseling: The patient is sincerely urged to quit consumption of tobacco. He is ready to quit tobacco.       History of Present Illness   HPI  Chief Complaint   Patient presents with   • Follow-up     Review labs and ct lung screening      Had been cutting back, still at now, only a few day     Had CT lung done in .   Pulmonary nodules measuring up to 4 mm.    Lung-RADS2, benign appearance or behavior.  Continue annual screening with LDCT in 12 months.  Aortic valve and coronary artery calcifications. Stable ascending thoracic aortic ectasia measuring 41 mm.     Denies chest pain.     Gets back & leg  "stiffness.     Review of Systems   Constitutional:  Negative for chills and fever.   Respiratory:  Negative for cough (less coughing while cutting down) and shortness of breath.    Cardiovascular:  Negative for chest pain and palpitations.   Gastrointestinal:         Does notice heartburn, acid feeling in stomach more than chest    Neurological:  Negative for dizziness, light-headedness and headaches.     Objective   /80   Pulse 77   Ht 5' 9.75\" (1.772 m)   Wt 90.4 kg (199 lb 6.4 oz)   SpO2 95%   BMI 28.82 kg/m²      Physical Exam  Vitals reviewed.   Constitutional:       General: He is not in acute distress.     Appearance: Normal appearance. He is not ill-appearing.   HENT:      Head: Normocephalic and atraumatic.   Eyes:      General: No scleral icterus.        Right eye: No discharge.         Left eye: No discharge.   Neck:      Vascular: No carotid bruit.   Cardiovascular:      Rate and Rhythm: Normal rate and regular rhythm.      Pulses: Normal pulses.      Heart sounds: Normal heart sounds. No murmur heard.  Pulmonary:      Effort: Pulmonary effort is normal. No respiratory distress.      Breath sounds: No wheezing.      Comments: Short BS b/l   Musculoskeletal:      Cervical back: Normal range of motion and neck supple.      Right lower leg: No edema.      Left lower leg: No edema.   Neurological:      Mental Status: He is alert and oriented to person, place, and time.      Gait: Gait normal.   Psychiatric:         Mood and Affect: Mood normal.         Behavior: Behavior normal.         Thought Content: Thought content normal.         Judgment: Judgment normal.       "

## 2025-04-01 ENCOUNTER — RESULTS FOLLOW-UP (OUTPATIENT)
Dept: FAMILY MEDICINE CLINIC | Facility: HOSPITAL | Age: 65
End: 2025-04-01

## 2025-04-01 LAB
FERRITIN SERPL-MCNC: 284 NG/ML (ref 30–400)
IRON SATN MFR SERPL: 21 % SATURATION
IRON SERPL-MCNC: 76 UG/DL
TRANSFERRIN SERPL-MCNC: 257 MG/DL

## 2025-04-06 DIAGNOSIS — I10 ESSENTIAL HYPERTENSION: ICD-10-CM

## 2025-04-08 ENCOUNTER — HOSPITAL ENCOUNTER (OUTPATIENT)
Dept: NON INVASIVE DIAGNOSTICS | Age: 65
Discharge: HOME | End: 2025-04-08
Payer: COMMERCIAL

## 2025-04-08 DIAGNOSIS — I25.10 CORONARY ARTERY CALCIFICATION SEEN ON CAT SCAN: ICD-10-CM

## 2025-04-08 PROCEDURE — 93880 EXTRACRANIAL BILAT STUDY: CPT

## 2025-04-08 PROCEDURE — 93880 EXTRACRANIAL BILAT STUDY: CPT | Performed by: SURGERY

## 2025-04-08 RX ORDER — LISINOPRIL 20 MG/1
20 TABLET ORAL DAILY
Qty: 90 TABLET | Refills: 1 | Status: SHIPPED | OUTPATIENT
Start: 2025-04-08

## 2025-04-10 ENCOUNTER — RESULTS FOLLOW-UP (OUTPATIENT)
Dept: FAMILY MEDICINE CLINIC | Facility: HOSPITAL | Age: 65
End: 2025-04-10

## 2025-06-17 ENCOUNTER — CONSULT (OUTPATIENT)
Dept: CARDIOLOGY CLINIC | Facility: CLINIC | Age: 65
End: 2025-06-17
Payer: COMMERCIAL

## 2025-06-17 VITALS
SYSTOLIC BLOOD PRESSURE: 120 MMHG | HEIGHT: 70 IN | HEART RATE: 102 BPM | DIASTOLIC BLOOD PRESSURE: 82 MMHG | WEIGHT: 203 LBS | BODY MASS INDEX: 29.06 KG/M2

## 2025-06-17 DIAGNOSIS — I10 ESSENTIAL HYPERTENSION: Primary | ICD-10-CM

## 2025-06-17 DIAGNOSIS — I35.9 AORTIC VALVE CALCIFICATION: ICD-10-CM

## 2025-06-17 DIAGNOSIS — I25.10 CORONARY ARTERY CALCIFICATION SEEN ON CAT SCAN: ICD-10-CM

## 2025-06-17 DIAGNOSIS — Z72.0 TOBACCO USE: ICD-10-CM

## 2025-06-17 DIAGNOSIS — R79.82 ELEVATED C-REACTIVE PROTEIN (CRP): ICD-10-CM

## 2025-06-17 DIAGNOSIS — R06.09 EXERTIONAL DYSPNEA: ICD-10-CM

## 2025-06-17 PROCEDURE — 99204 OFFICE O/P NEW MOD 45 MIN: CPT | Performed by: INTERNAL MEDICINE

## 2025-06-17 PROCEDURE — 93000 ELECTROCARDIOGRAM COMPLETE: CPT | Performed by: INTERNAL MEDICINE

## 2025-06-17 NOTE — PROGRESS NOTES
[facility]    Name:Rodrigo Puente   DOS: 6/17/2025     Chief Complaint:   Chief Complaint   Patient presents with    New Patient Visit     Consult new patient referred by Maryam Diego ([facility]). Essential hypertension.    SOB w elevated platforms such as steps, or uphill. Pt does continue to smoke he says 1 pack per week. No other cardiac complaints.        HISTORY OF PRESENT ILLNESS:    HPI:  Rodrigo Puente is a 65 y.o. male. He  has a past medical history of Arthritis, Bronchitis, Hypertension, and Osteoarthritis.    He presents to establish care with a cardiologist at the recommendation of his PCP for evaluation of hypertension, aortic valve and coronary calcification (noted incidentally on CT chest done for lung cancer screening).  He has a past medical history significant for hypertension, tobacco use, hyperlipidemia.    He reports shortness of breath with exertion, but otherwise has no cardiopulmonary complaints.  Specifically denies chest pain, diaphoresis, dizziness, palpitations, orthopnea, edema.  He has had no syncopal episodes.    Currently smokes 1 pack of cigarettes per week, working on cutting down.     ROS    ROS: Pertinent positives and negatives as described in History of Present Illness. Remainder of a 14 point review of systems was negative.     Allergies[1]     Medications Ordered Prior to Encounter[2]    Past Medical History[3]    Past Surgical History[4]    Family History[5]    Social History     Socioeconomic History    Marital status:      Spouse name: Not on file    Number of children: Not on file    Years of education: Not on file    Highest education level: 12th grade   Occupational History    Occupation: glass tech   Tobacco Use    Smoking status: Some Days     Current packs/day: 1.00     Types: Cigarettes    Smokeless tobacco: Never    Tobacco comments:     About 2 cigarettes a day some days   Vaping Use    Vaping status: Never  Used   Substance and Sexual Activity    Alcohol use: Not Currently     Comment: rare    Drug use: No    Sexual activity: Not Currently     Partners: Female   Other Topics Concern    Not on file   Social History Narrative    Wears seatbelt        Exercise habits     Lives with family      Social Drivers of Health     Financial Resource Strain: Low Risk  (12/20/2023)    Overall Financial Resource Strain (CARDIA)     Difficulty of Paying Living Expenses: Not hard at all   Food Insecurity: No Food Insecurity (12/27/2024)    Nursing - Inadequate Food Risk Classification     Worried About Running Out of Food in the Last Year: Never true     Ran Out of Food in the Last Year: Never true     Ran Out of Food in the Last Year: Not on file   Transportation Needs: No Transportation Needs (12/27/2024)    PRAPARE - Transportation     Lack of Transportation (Medical): No     Lack of Transportation (Non-Medical): No   Physical Activity: Sufficiently Active (5/3/2019)    Exercise Vital Sign     Days of Exercise per Week: 5 days     Minutes of Exercise per Session: 150+ min   Stress: No Stress Concern Present (5/3/2019)    Citizen of the [place] [employer] - Occupational Stress Questionnaire     Feeling of Stress : Not at all   Social Connections: Moderately Isolated (5/3/2019)    Social Connection and Isolation Panel     Frequency of Communication with Friends and Family: More than three times a week     Frequency of Social Gatherings with Friends and Family: Never     Attends Faith Services: Never     Active Member of Clubs or Organizations: Yes     Attends Club or Organization Meetings: More than 4 times per year     Marital Status:    Intimate Partner Violence: Not At Risk (5/3/2019)    Humiliation, Afraid, Rape, and Kick questionnaire     Fear of Current or Ex-Partner: No     Emotionally Abused: No     Physically Abused: No     Sexually Abused: No   Housing Stability: Low Risk  (12/27/2024)    Housing  "Stability Vital Sign     Unable to Pay for Housing in the Last Year: No     Number of Times Moved in the Last Year: 0     Homeless in the Last Year: No       OBJECTIVE:    /82 (BP Location: Left arm, Patient Position: Sitting, Cuff Size: Standard)   Pulse 102   Ht 5' 9.75\" (1.772 m)   Wt 92.1 kg (203 lb)   BMI 29.34 kg/m²      BP Readings from Last 3 Encounters:   06/17/25 120/82   03/31/25 110/80   12/27/24 120/78       Wt Readings from Last 3 Encounters:   06/17/25 92.1 kg (203 lb)   03/31/25 90.4 kg (199 lb 6.4 oz)   12/27/24 92.7 kg (204 lb 6.4 oz)         Physical Exam  Vitals reviewed.   Constitutional:       General: He is not in acute distress.     Appearance: Normal appearance. He is not diaphoretic.   HENT:      Head: Normocephalic and atraumatic.     Eyes:      Conjunctiva/sclera: Conjunctivae normal.     Neck:      Vascular: No JVD.     Cardiovascular:      Rate and Rhythm: Normal rate and regular rhythm.      Pulses: Normal pulses.      Heart sounds: Normal heart sounds. No murmur heard.     No friction rub. No gallop.   Pulmonary:      Effort: Pulmonary effort is normal.      Breath sounds: Normal breath sounds. No wheezing, rhonchi or rales.   Abdominal:      General: Abdomen is flat. Bowel sounds are normal. There is no distension.     Musculoskeletal:      Right lower leg: No edema.      Left lower leg: No edema.     Skin:     General: Skin is warm and dry.     Neurological:      General: No focal deficit present.      Mental Status: He is alert and oriented to person, place, and time.     Psychiatric:         Mood and Affect: Mood normal.         Behavior: Behavior normal.                                                       Cardiac testing:   EKG reviewed personally: Sinus tachycardia (102 bpm) with a single PVC or aberrantly conducted PAC.  Cannot rule out inferior infarct      LABS:  Lab Results   Component Value Date    BUN 20 03/21/2025    CREATININE 0.78 03/21/2025    CALCIUM 9.0 " 10/15/2020    K 4.7 03/21/2025    CO2 21 03/21/2025    CL 99 03/21/2025    ALKPHOS 176 (H) 03/04/2019    AST 14 03/21/2025    ALT 10 03/21/2025        Lab Results   Component Value Date    WBC 7.4 03/21/2025    HGB 14.0 03/21/2025    HCT 40.7 03/21/2025    MCV 99 (H) 03/21/2025     03/21/2025       Lab Results   Component Value Date    HDL 54 03/21/2025    LDLCALC 98 03/21/2025    TRIG 138 03/21/2025       Lab Results   Component Value Date    HGBA1C 5.6 03/21/2025       Lab Results   Component Value Date    TSH 1.390 12/14/2024           ASSESSMENT/PLAN:  Diagnoses and all orders for this visit:    Essential hypertension  -     Ambulatory Referral to Cardiology  -     POCT ECG    Coronary artery calcification seen on CAT scan  -     Ambulatory Referral to Cardiology  -     POCT ECG  -     Echo complete w/ contrast if indicated; Future  -     Stress test only, exercise; Future  -     Lipid Panel With Direct LDL; Future    Elevated C-reactive protein (CRP)  -     Ambulatory Referral to Cardiology  -     POCT ECG    Exertional dyspnea  -     Stress test only, exercise; Future    Tobacco use    Aortic valve calcification  -     Echo complete w/ contrast if indicated; Future  -     Stress test only, exercise; Future    We discussed what coronary artery calcification represents (diffuse plaque disease but NOT necessarily vascular obstruction).  We discussed that although he did not have a dedicated coronary calcium scoring study, the presence of coronary calcium in this amount suggests at least a mild to moderately increased risk for the development of symptomatic cardiovascular disease including myocardial infarction or angina over the next 8 to 10 years. We discussed that, in the absence of exertional symptoms and a good functional capacity, additional studies such as stress testing generally are not necessary at this juncture as they incorrectly shift the focus of therapy to angiography rather than the systemic  "disease taking place. We also reviewed the inflammatory component through to be associated with arterial plaque formation.     I shifted his focus to saturated fats as well and suggested the following:   - an optimal LDL cholesterol level below 70 mg/dL or at least a 30% reduction in LDL-C from this baseline over the next several months.   - Increased consumption of fresh fruits and vegetables.  - Following a \"Mediterranean\" eating style with increased consumption of olive oil, almonds and walnuts while limiting refined carbohydrates such as white bread, pasta sugar, white rice and potatoes.  - Specifically, avoiding “bad fats” such as saturated and trans-fatty acids or hydrogenated vegetable oils as well as processed meats is advisable.  - Physical activity, the equivalent of a brisk walk, for 30 minutes, most days of the week.     He has already been started on a statin by his PCP, rosuvastatin 10 mg once daily.  I agree with this dose, and have advised him to continue this medication at the same dose.  Will repeat his lipids now to ensure that his LDL is at goal.    We reviewed his blood pressure, noting stable blood pressure on lisinopril and metoprolol at the above-mentioned doses.  I have recommended that he continue both of these medications without adjustment to dose.    He is reporting exertional dyspnea, which may be an anginal equivalent.  In the setting of coronary calcium and longstanding tobacco use, I referred him for a treadmill stress ECG.  We will also be checking a transthoracic echocardiogram given noted aortic valve calcification on his CT chest.  There is no murmur on auscultation.      Enedelia Scott MD [facility]      Portions of the record may have been created with voice recognition software. Occasional wrong word or \"sound alike\" substitutions may have occurred due to the inherent limitations of voice recognition software. Please review the chart carefully and recognize, using context, where " substitutions/typographical errors may have occurred.          [1]   Allergies  Allergen Reactions    Other      Thiomersal (contact solution) - pt reaction is eye redness     [2]   Current Outpatient Medications on File Prior to Visit   Medication Sig Dispense Refill    b complex vitamins capsule Take 1 capsule by mouth daily 90 capsule 0    Cholecalciferol (RA Vitamin D-3) 25 MCG (1000 UT) tablet Take 4 tablets (4,000 Units total) by mouth daily 120 tablet 5    folic acid (FOLVITE) 1 mg tablet Take 1 tablet (1 mg total) by mouth daily 30 tablet 5    ibuprofen (MOTRIN) 200 mg tablet Take 2 tablets (400 mg total) by mouth every 6 (six) hours as needed for mild pain 60 tablet 3    lisinopril (ZESTRIL) 20 mg tablet TAKE 1 TABLET BY MOUTH EVERY DAY 90 tablet 1    metoprolol succinate (TOPROL-XL) 100 mg 24 hr tablet Take 1 tablet (100 mg total) by mouth daily 90 tablet 1    Multiple Vitamins-Minerals (PRESERVISION AREDS PO) Take by mouth      rosuvastatin (CRESTOR) 10 MG tablet Take 0.5 tablets (5 mg total) by mouth daily for 14 days, THEN 1 tablet (10 mg total) daily. 90 tablet 0     No current facility-administered medications on file prior to visit.   [3]   Past Medical History:  Diagnosis Date    Arthritis     Bronchitis     Hypertension     Osteoarthritis     bilateral knees   [4]   Past Surgical History:  Procedure Laterality Date    FRACTURE SURGERY Left     orbital   [5]   Family History  Problem Relation Name Age of Onset    Hypertension Sister Mamta     Hypertension Sister Mamta

## 2025-06-17 NOTE — PATIENT INSTRUCTIONS
You were seen today in the Cardiology office for evaluation of coronary calcium and hypertension.     Below is a summary of the recommendations based upon today's visit:    1. Dietary modifications - Follow a Mediterranean diet - one that is low in saturated fats (avoid red meat, dairy, cheeses), emphasize chicken, fish, turkey, tofu, vegetables, fruit (moderate quantities); also avoid simple carbs/starch/sugars, use whole wheat/grain/bran/oatmeal, snack on small quantities of nuts and fruits.     2. Exercise is very important. Ideally, AT LEAST four to five times weekly for 30 to 60 minutes per session - both cardiovascular and resistance work is OK. Listen to your body and rest when needed.    3. Continue all present medications    4. Testing prior to your next visit includes:  blood work, stress test, and ultrasound of the heart    5. Remember the ABCDEs to cardiovascular health for patients:  A: Awareness of cardiovascular symptoms  B: Blood pressure control  C: Cholesterol control  C: Cigarette avoidance  D: Diabetes control  D: Dietary choices  E: Exercise    6. Return in 3 months     Any testing ordered in office today will be available for patient review via [facility], and reviewed with me at the follow-up office visit as scheduled.    Thank you for choosing [facility].    Please call our office or use [facility] with any questions.

## 2025-06-29 DIAGNOSIS — I25.10 CORONARY ARTERY CALCIFICATION SEEN ON CAT SCAN: ICD-10-CM

## 2025-06-29 DIAGNOSIS — R79.82 ELEVATED C-REACTIVE PROTEIN (CRP): ICD-10-CM

## 2025-06-29 DIAGNOSIS — I10 ESSENTIAL HYPERTENSION: ICD-10-CM

## 2025-07-01 RX ORDER — ROSUVASTATIN CALCIUM 10 MG/1
TABLET, COATED ORAL
Qty: 90 TABLET | Refills: 1 | Status: SHIPPED | OUTPATIENT
Start: 2025-07-01

## 2025-07-01 RX ORDER — METOPROLOL SUCCINATE 100 MG/1
100 TABLET, EXTENDED RELEASE ORAL DAILY
Qty: 90 TABLET | Refills: 1 | Status: SHIPPED | OUTPATIENT
Start: 2025-07-01

## 2025-07-18 LAB
CHOLEST SERPL-MCNC: 129 MG/DL (ref 100–199)
HDLC SERPL-MCNC: 67 MG/DL
LDLC SERPL CALC-MCNC: 46 MG/DL (ref 0–99)
LDLC/HDLC SERPL: 0.7 RATIO (ref 0–3.6)
SL AMB VLDL CHOLESTEROL CALC: 16 MG/DL (ref 5–40)
TRIGL SERPL-MCNC: 81 MG/DL (ref 0–149)

## 2025-07-25 ENCOUNTER — TELEPHONE (OUTPATIENT)
Age: 65
End: 2025-07-25

## 2025-07-25 NOTE — TELEPHONE ENCOUNTER
Patient called. He has a stress test scheduled for 7/29.  He states he will not be able to exercise without significant debilitating knee swelling and pain and asking if a resting study can be done.    Please advise

## 2025-07-29 ENCOUNTER — HOSPITAL ENCOUNTER (OUTPATIENT)
Dept: NON INVASIVE DIAGNOSTICS | Age: 65
Discharge: HOME | End: 2025-07-29
Attending: INTERNAL MEDICINE
Payer: COMMERCIAL

## 2025-07-29 VITALS
WEIGHT: 203 LBS | DIASTOLIC BLOOD PRESSURE: 82 MMHG | HEIGHT: 70 IN | HEART RATE: 83 BPM | SYSTOLIC BLOOD PRESSURE: 120 MMHG | BODY MASS INDEX: 29.06 KG/M2

## 2025-07-29 DIAGNOSIS — I35.9 AORTIC VALVE CALCIFICATION: ICD-10-CM

## 2025-07-29 DIAGNOSIS — I25.10 CORONARY ARTERY CALCIFICATION SEEN ON CAT SCAN: ICD-10-CM

## 2025-07-29 DIAGNOSIS — R06.09 EXERTIONAL DYSPNEA: ICD-10-CM

## 2025-07-29 LAB
AORTIC ROOT: 3.9 CM
AORTIC VALVE MEAN VELOCITY: 26.8 M/S
ASCENDING AORTA: 3.9 CM
AV AREA BY CONTINUOUS VTI: 0.9 CM2
AV AREA PEAK VELOCITY: 0.9 CM2
AV LVOT MEAN GRADIENT: 2 MMHG
AV LVOT PEAK GRADIENT: 3 MMHG
AV MEAN PRESS GRAD SYS DOP V1V2: 31 MMHG
AV ORIFICE AREA US: 0.9 CM2
AV PEAK GRADIENT: 47 MMHG
AV VELOCITY RATIO: 0.24
AV VMAX SYS DOP: 3.42 M/S
AVA (PLAN): 1 CM2
BSA FOR ECHO PROCEDURE: 2.1 M2
DOP CALC AO VTI: 78.52 CM
DOP CALC LVOT AREA: 3.8 CM2
DOP CALC LVOT CARDIAC INDEX: 2.17 L/MIN/M2
DOP CALC LVOT CARDIAC OUTPUT: 4.55 L/MIN
DOP CALC LVOT DIAMETER: 2.2 CM
DOP CALC LVOT PEAK VEL VTI: 18.54 CM
DOP CALC LVOT PEAK VEL: 0.82 M/S
DOP CALC LVOT STROKE INDEX: 32.4 ML/M2
DOP CALC LVOT STROKE VOLUME: 70.44
E WAVE DECELERATION TIME: 205 MS
E/A RATIO: 0.92
FRACTIONAL SHORTENING: 35 (ref 28–44)
INTERVENTRICULAR SEPTUM IN DIASTOLE (PARASTERNAL SHORT AXIS VIEW): 1.4 CM
INTERVENTRICULAR SEPTUM: 1.4 CM (ref 0.6–1.1)
LAAS-AP2: 14.7 CM2
LAAS-AP4: 24.6 CM2
LEFT ATRIUM SIZE: 4.4 CM
LEFT ATRIUM VOLUME (MOD BIPLANE): 63 ML
LEFT ATRIUM VOLUME INDEX (MOD BIPLANE): 30 ML/M2
LEFT INTERNAL DIMENSION IN SYSTOLE: 3.1 CM (ref 2.1–4)
LEFT VENTRICLE DIASTOLIC VOLUME (MOD BIPLANE): 203 ML
LEFT VENTRICLE DIASTOLIC VOLUME INDEX (MOD BIPLANE): 96.7 ML/M2
LEFT VENTRICLE SYSTOLIC VOLUME (MOD BIPLANE): 87 ML
LEFT VENTRICLE SYSTOLIC VOLUME INDEX (MOD BIPLANE): 41.4 ML/M2
LEFT VENTRICULAR INTERNAL DIMENSION IN DIASTOLE: 4.8 CM (ref 3.5–6)
LEFT VENTRICULAR POSTERIOR WALL IN END DIASTOLE: 1.3 CM
LEFT VENTRICULAR STROKE VOLUME: 72 ML
LV EF BIPLANE MOD: 57 %
LV EF US.2D.A4C+ESTIMATED: 61 %
LVSV (TEICH): 72 ML
MV E'TISSUE VEL-LAT: 10 CM/S
MV E'TISSUE VEL-SEP: 8 CM/S
MV PEAK A VEL: 0.87 M/S
MV PEAK E VEL: 80 CM/S
MV STENOSIS PRESSURE HALF TIME: 59 MS
MV VALVE AREA P 1/2 METHOD: 3.73
RIGHT ATRIAL 2D VOLUME: 83 ML
RIGHT ATRIUM AREA SYSTOLE A4C: 23.9 CM2
RIGHT VENTRICLE ID DIMENSION: 3.8 CM
SL CV LEFT ATRIUM LENGTH A2C: 4.5 CM
SL CV LV EF: 44
SL CV PED ECHO LEFT VENTRICLE DIASTOLIC VOLUME (MOD BIPLANE) 2D: 109 ML
SL CV PED ECHO LEFT VENTRICLE SYSTOLIC VOLUME (MOD BIPLANE) 2D: 38 ML
TR MAX PG: 23 MMHG
TR PEAK VELOCITY: 2.4 M/S
TRICUSPID ANNULAR PLANE SYSTOLIC EXCURSION: 2.1 CM
TRICUSPID VALVE PEAK REGURGITATION VELOCITY: 2.39 M/S

## 2025-07-29 PROCEDURE — 93306 TTE W/DOPPLER COMPLETE: CPT | Performed by: INTERNAL MEDICINE

## 2025-07-29 PROCEDURE — 93306 TTE W/DOPPLER COMPLETE: CPT

## 2025-08-22 ENCOUNTER — TELEPHONE (OUTPATIENT)
Age: 65
End: 2025-08-22